# Patient Record
Sex: FEMALE | Race: WHITE | NOT HISPANIC OR LATINO | Employment: UNEMPLOYED | URBAN - METROPOLITAN AREA
[De-identification: names, ages, dates, MRNs, and addresses within clinical notes are randomized per-mention and may not be internally consistent; named-entity substitution may affect disease eponyms.]

---

## 2018-01-10 ENCOUNTER — OFFICE VISIT (OUTPATIENT)
Dept: FAMILY MEDICINE | Facility: CLINIC | Age: 33
End: 2018-01-10
Payer: COMMERCIAL

## 2018-01-10 ENCOUNTER — RADIANT APPOINTMENT (OUTPATIENT)
Dept: GENERAL RADIOLOGY | Facility: CLINIC | Age: 33
End: 2018-01-10
Attending: NURSE PRACTITIONER
Payer: COMMERCIAL

## 2018-01-10 VITALS
TEMPERATURE: 98.6 F | WEIGHT: 223 LBS | HEIGHT: 66 IN | DIASTOLIC BLOOD PRESSURE: 71 MMHG | SYSTOLIC BLOOD PRESSURE: 105 MMHG | BODY MASS INDEX: 35.84 KG/M2 | HEART RATE: 72 BPM | OXYGEN SATURATION: 98 %

## 2018-01-10 DIAGNOSIS — E66.812 CLASS 2 OBESITY DUE TO EXCESS CALORIES WITHOUT SERIOUS COMORBIDITY WITH BODY MASS INDEX (BMI) OF 36.0 TO 36.9 IN ADULT: ICD-10-CM

## 2018-01-10 DIAGNOSIS — M54.41 ACUTE BILATERAL LOW BACK PAIN WITH BILATERAL SCIATICA: Primary | ICD-10-CM

## 2018-01-10 DIAGNOSIS — M54.42 ACUTE BILATERAL LOW BACK PAIN WITH BILATERAL SCIATICA: Primary | ICD-10-CM

## 2018-01-10 DIAGNOSIS — M54.41 ACUTE BILATERAL LOW BACK PAIN WITH BILATERAL SCIATICA: ICD-10-CM

## 2018-01-10 DIAGNOSIS — M54.42 ACUTE BILATERAL LOW BACK PAIN WITH BILATERAL SCIATICA: ICD-10-CM

## 2018-01-10 DIAGNOSIS — E66.09 CLASS 2 OBESITY DUE TO EXCESS CALORIES WITHOUT SERIOUS COMORBIDITY WITH BODY MASS INDEX (BMI) OF 36.0 TO 36.9 IN ADULT: ICD-10-CM

## 2018-01-10 PROCEDURE — 99214 OFFICE O/P EST MOD 30 MIN: CPT | Performed by: NURSE PRACTITIONER

## 2018-01-10 PROCEDURE — 72100 X-RAY EXAM L-S SPINE 2/3 VWS: CPT | Mod: FY

## 2018-01-10 RX ORDER — METHOCARBAMOL 500 MG/1
1000 TABLET, FILM COATED ORAL 3 TIMES DAILY PRN
Qty: 30 TABLET | Refills: 1 | Status: SHIPPED | OUTPATIENT
Start: 2018-01-10 | End: 2022-12-02

## 2018-01-10 RX ORDER — ALBUTEROL SULFATE 90 UG/1
AEROSOL, METERED RESPIRATORY (INHALATION)
COMMUNITY
Start: 2017-12-29 | End: 2024-04-08

## 2018-01-10 RX ORDER — ALBUTEROL SULFATE 0.83 MG/ML
2.5 SOLUTION RESPIRATORY (INHALATION)
COMMUNITY
Start: 2017-12-29 | End: 2023-02-15

## 2018-01-10 RX ORDER — ALBUTEROL SULFATE 90 UG/1
2 AEROSOL, METERED RESPIRATORY (INHALATION)
COMMUNITY
Start: 2017-12-29 | End: 2023-09-26

## 2018-01-10 RX ORDER — ONDANSETRON 4 MG/1
4 TABLET, ORALLY DISINTEGRATING ORAL
COMMUNITY
Start: 2017-11-26 | End: 2022-12-02

## 2018-01-10 RX ORDER — NORELGESTROMIN AND ETHINYL ESTRADIOL 35; 150 UG/MG; UG/MG
1 PATCH TRANSDERMAL
COMMUNITY
End: 2024-04-08

## 2018-01-10 RX ORDER — PREDNISONE 20 MG/1
40 TABLET ORAL DAILY
Qty: 10 TABLET | Refills: 0 | Status: SHIPPED | OUTPATIENT
Start: 2018-01-10 | End: 2018-01-15

## 2018-01-10 NOTE — PROGRESS NOTES
SUBJECTIVE:   Keyla Arnold is a 32 year old female who presents to clinic today for the following health issues:  Back Pain     Duration: 2006- worsened in past weekend        Specific cause: lifting, turning/bending, walking    Description:   Location of pain: low back bilateral  Character of pain: sharp and stabbing  Pain radiation:radiates into the bilateral ankles  New numbness or weakness in legs, not attributed to pain:  YES, falls over when walking    Intensity: Currently 9/10, At its worst 10/10    Symptoms: bruising on back of both legs    History:   Pain interferes with job: YES,  History of back problems: Yes  Any previous MRI or X-rays: No  Sees a specialist for back pain:  No  Was referred to physical therapy in 2016 and it helped but started hurting again. Does not remember the clinic  Therapies tried without relief: NSAIDs- 600mg    Alleviating factors:   Improved by: nothing      Precipitating factors:  Worsened by: Lifting, Bending and Standing    Functional and Psychosocial Screen (Chandrakant STarT Back):      Not performed today  No falls/injury, has had symptoms off and on since 2006 when her son was born (had an epidural). She reports that she cannot stand at the sink to wash dishes, lift up her son, fold clothes due to worsening pain.  She has not had imaging done.  She was seen for this in [facility] at [facility], was referred to physical therapy which helped but pain returned when she stopped doing physical therapy despite continuing HEP.   LAST MENSTUAL PERIOD 1/1/18, using contraceptive patch for contraception.    Accompanying Signs & Symptoms:  Risk of Fracture:  None  Risk of Cauda Equina:  None  Risk of Infection:  None  Risk of Cancer:  None  Risk of Ankylosing Spondylitis:  Onset at age <35, male, AND morning back stiffness. no         Problem list and histories reviewed & adjusted, as indicated.  Additional history: as documented    There is no problem list on file for this patient.     "History reviewed. No pertinent surgical history.    Social History   Substance Use Topics     Smoking status: Former Smoker     Smokeless tobacco: Former User     Alcohol use No     Family History   Problem Relation Age of Onset     DIABETES Mother      DIABETES Other          Current Outpatient Prescriptions   Medication Sig Dispense Refill     albuterol (2.5 MG/3ML) 0.083% neb solution Inhale 2.5 mg into the lungs       VENTOLIN  (90 BASE) MCG/ACT Inhaler        albuterol (PROAIR HFA/PROVENTIL HFA/VENTOLIN HFA) 108 (90 BASE) MCG/ACT Inhaler Inhale 2 puffs into the lungs       norelgestromin-ethinyl estradiol (ORTHO EVRA) 150-35 MCG/24HR patch 1 patch       ondansetron (ZOFRAN-ODT) 4 MG ODT tab Take 4 mg by mouth       BP Readings from Last 3 Encounters:   01/10/18 105/71    Wt Readings from Last 3 Encounters:   01/10/18 223 lb (101.2 kg)                  Labs reviewed in EPIC        Reviewed and updated as needed this visit by clinical staff       Reviewed and updated as needed this visit by Provider         ROS:  Constitutional, HEENT, cardiovascular, pulmonary, gi and gu systems are negative, except as otherwise noted.      OBJECTIVE:   /71 (BP Location: Left arm, Patient Position: Chair, Cuff Size: Adult Large)  Pulse 72  Temp 98.6  F (37  C) (Oral)  Ht 5' 5.75\" (1.67 m)  Wt 223 lb (101.2 kg)  LMP 01/02/2018 (Within Days)  SpO2 98%  BMI 36.27 kg/m2  Body mass index is 36.27 kg/(m^2).  GENERAL: healthy, alert and no distress  EYES: Eyes grossly normal to inspection, PERRL and conjunctivae and sclerae normal  HENT: ear canals and TM's normal, nose and mouth without ulcers or lesions  NECK: no adenopathy, no asymmetry, masses, or scars and thyroid normal to palpation  RESP: lungs clear to auscultation - no rales, rhonchi or wheezes  CV: regular rate and rhythm, normal S1 S2, no S3 or S4, no murmur, click or rub, no peripheral edema and peripheral pulses strong  ABDOMEN: soft, nontender, no " "hepatosplenomegaly, no masses and bowel sounds normal  MS: no gross musculoskeletal defects noted, no edema  SKIN: no suspicious lesions or rashes  NEURO: Normal strength and tone, mentation intact and speech normal  Comprehensive back pain exam:  Tenderness of midline lumbar L3-L5 region with lumbar paraspinals, no SI joint TTP, Pain limits the following motions: all planes, Lower extremity strength functional and equal on both sides, Lower extremity reflexes within normal limits bilaterally, Lower extremity sensation normal and equal on both sides and Straight leg raise negative bilaterally  PSYCH: mentation appears normal, tearful, anxious and appearance well groomed  LYMPH: normal ant/post cervical, supraclavicular nodes    Diagnostic Test Results:  Xray - No acute changes by my independent read, await formal read by Radiology.   Order   XR Lumbar Spine 2/3 Views [[id number]] (Order [id number])   Exam Information   Exam Date Exam Time Accession # Performing Department Results    1/10/18 12:03 PM [id number] [facility]    [employer] Interactive Report and InfoRx   View the interactive report   PACS Images   Show images for XR Lumbar Spine 2/3 Views   Study Result   XR LUMBAR SPINE 2-3 VIEWS 1/10/2018 12:03 PM     HISTORY: Chronic low back pain.     COMPARISON: None.     FINDINGS: Lumbar alignment is anatomic. Vertebral body heights and  disc spaces are preserved.         IMPRESSION: Normal lumbar spine.     ITZEL MALDONADO MD       ASSESSMENT/PLAN:           BMI:   Estimated body mass index is 36.27 kg/(m^2) as calculated from the following:    Height as of this encounter: 5' 5.75\" (1.67 m).    Weight as of this encounter: 223 lb (101.2 kg).   Weight management plan: Discussed healthy diet and exercise guidelines and patient will follow up in 12 months in clinic to re-evaluate.        1. Acute bilateral low back pain with bilateral sciatica  Low Back Pain.    The patient was advised to apply " heat intermittently (avoid sleeping on heating pad).  Lifting mechanics discussed  Analgesics such as Tylenol and/or ibuprofen, see epic for orders.  Back exercises, instruction sheet given  Aerobic exercise program, this was strongly encouraged as one of the best ways to manage chronic back pain  Physical therapy/imaging if symptoms not improving  Patient insisting on x ray which was ordered.  I will call with any abnormalities.  Patient was instructed to f/u if symptoms worsen or fail to improve as anticipated.        - methocarbamol (ROBAXIN) 500 MG tablet; Take 2 tablets (1,000 mg) by mouth 3 times daily as needed for muscle spasms  Dispense: 30 tablet; Refill: 1  - [facility] PT, HAND, AND CHIROPRACTIC REFERRAL  - predniSONE (DELTASONE) 20 MG tablet; Take 2 tablets (40 mg) by mouth daily for 5 days  Dispense: 10 tablet; Refill: 0  - XR Lumbar Spine 2/3 Views; Future    2. Class 2 obesity due to excess calories without serious comorbidity with body mass index (BMI) of 36.0 to 36.9 in adult  Benefits of weight loss reviewed in detail, encouraged her to cut back on the carbohydrates in the diet, consume more fruits and vegetables, drink plenty of water, avoid fruit juices, sodas, get 150 min moderate exercise/week.  Recheck weight in 6 months.        See Patient Instructions    NIDIA Fermin [facility]

## 2018-01-10 NOTE — PATIENT INSTRUCTIONS
"  * Sciatica    Sciatica (\"Lumbar Radiculopathy\") causes a pain that spreads from the lower back down into the buttock, hip and leg. Sometimes leg pain can occur without any back pain. Sciatica is due to irritation or pressure on a spinal nerve as it comes out of the spinal canal. This is most often due to a bulge or rupture of a nearby spinal disk (the cartilage cushion between each spinal bone), which presses on a nearby nerve. Other causes include spinal stenosis (narrowing of the spinal canal) and spasm of the piriformis muscle (a muscle in the buttocks that the sciatic nerve passes through).  Sciatica may begin after a sudden twisting/bending force (such as in a car accident), or sometimes after a simple awkward movement. In either case, muscle spasm is commonly present and contributes to the pain.  The diagnosis of sciatica is made from the symptoms and physical exam. Unless you had a physical injury (such as a car accident or fall), X-rays are usually not ordered for the initial evaluation of sciatica because the nerves and disks cannot be seen on an X-ray. Most sciatica (80-90%) gets better with time.  What can I do about my low back pain?  There are three main things you can do to ease low back pain and help it go away.    Use heat or cold packs.    Take medicine as directed.    Use positions, movements and exercises. Stay active! Too much rest can make your symptoms worse.  Using heat or cold packs  Try cold packs or gentle heat to ease your pain. Use whichever gives the most relief. Apply the cold pack or heat for 15 minutes at a time, as often as needed.  Taking medicine  If taking over-the-counter medicine:    Take ibuprofen (Advil, Motrin) 600 mg. three times a day as needed for pain.  OR    Take Aleve (naproxen sodium) 220 to 440 mg. two times a day as needed for pain  If your doctor prescribed a muscle relaxant (cyclobenzapine 10 mg.):    Take one half ( ) to 1 tablet at bedtime    Do not drive when " taking this medicine. This drug may make you sleepy.  Using positions, movements and exercises  Research tells us that moving your joints and muscles can help you recover from back pain. Such activity should be simple and gentle.  Use the positions below as well as walking to help relieve your discomfort. Try taking a short walk every 3 to 4 hours during the day. Walk for a few minutes inside your home or take longer walks outside, on a treadmill or at a mall. Slowly increase the amount of time you walk. Expect discomfort when you begin, but it should lessen as your back starts to recover.  Finding a position that is comfortable  When your back pain is new, you may find that certain positions will ease your pain. Gently try each of the following positions until you find one that eases your pain. Once you find a position of comfort, use it as often as you like while you recover. Return to your daily routine as soon as possible.     Lie on your back with your legs bent. You can do this by placing a pillow under your knees or lie on the floor and rest your lower legs on the seat of a chair.    Lie on your side with your knees bent and place a pillow between your knees.    Lie on your stomach over pillows.  When should I call my doctor?  Your back pain should improve over the first couple of weeks. As it improves, you should be able to return to your normal activities. But call your doctor if:    You have a sudden change in your ability to control? your bladder or bowels.    You begin to feel tingling in your groin or legs.    The pain spreads down your leg and into your foot.    Your toes, feet or leg muscles begin to feel weak.    You feel generally unwell or sick.    Your pain gets worse.    5376-7544 [employer]. [address] [place] [address], [place] [address]. All rights reserved. This information is not intended as a substitute for professional medical care. Always follow your healthcare professional's  instructions.  This information has been modified by your health care provider with permission from the publisher.

## 2018-01-10 NOTE — MR AVS SNAPSHOT
"              After Visit Summary   1/10/2018    Keyla Arnold    MRN: [id number]           Patient Information     Date Of Birth          1985        Visit Information        Provider Department      1/10/2018 11:00 AM Vivian Brito APRN [facility]        Today's Diagnoses     Acute bilateral low back pain with bilateral sciatica    -  1      Care Instructions      * Sciatica    Sciatica (\"Lumbar Radiculopathy\") causes a pain that spreads from the lower back down into the buttock, hip and leg. Sometimes leg pain can occur without any back pain. Sciatica is due to irritation or pressure on a spinal nerve as it comes out of the spinal canal. This is most often due to a bulge or rupture of a nearby spinal disk (the cartilage cushion between each spinal bone), which presses on a nearby nerve. Other causes include spinal stenosis (narrowing of the spinal canal) and spasm of the piriformis muscle (a muscle in the buttocks that the sciatic nerve passes through).  Sciatica may begin after a sudden twisting/bending force (such as in a car accident), or sometimes after a simple awkward movement. In either case, muscle spasm is commonly present and contributes to the pain.  The diagnosis of sciatica is made from the symptoms and physical exam. Unless you had a physical injury (such as a car accident or fall), X-rays are usually not ordered for the initial evaluation of sciatica because the nerves and disks cannot be seen on an X-ray. Most sciatica (80-90%) gets better with time.  What can I do about my low back pain?  There are three main things you can do to ease low back pain and help it go away.    Use heat or cold packs.    Take medicine as directed.    Use positions, movements and exercises. Stay active! Too much rest can make your symptoms worse.  Using heat or cold packs  Try cold packs or gentle heat to ease your pain. Use whichever gives the most relief. Apply the cold pack or heat " for 15 minutes at a time, as often as needed.  Taking medicine  If taking over-the-counter medicine:    Take ibuprofen (Advil, Motrin) 600 mg. three times a day as needed for pain.  OR    Take Aleve (naproxen sodium) 220 to 440 mg. two times a day as needed for pain  If your doctor prescribed a muscle relaxant (cyclobenzapine 10 mg.):    Take one half ( ) to 1 tablet at bedtime    Do not drive when taking this medicine. This drug may make you sleepy.  Using positions, movements and exercises  Research tells us that moving your joints and muscles can help you recover from back pain. Such activity should be simple and gentle.  Use the positions below as well as walking to help relieve your discomfort. Try taking a short walk every 3 to 4 hours during the day. Walk for a few minutes inside your home or take longer walks outside, on a treadmill or at a mall. Slowly increase the amount of time you walk. Expect discomfort when you begin, but it should lessen as your back starts to recover.  Finding a position that is comfortable  When your back pain is new, you may find that certain positions will ease your pain. Gently try each of the following positions until you find one that eases your pain. Once you find a position of comfort, use it as often as you like while you recover. Return to your daily routine as soon as possible.     Lie on your back with your legs bent. You can do this by placing a pillow under your knees or lie on the floor and rest your lower legs on the seat of a chair.    Lie on your side with your knees bent and place a pillow between your knees.    Lie on your stomach over pillows.  When should I call my doctor?  Your back pain should improve over the first couple of weeks. As it improves, you should be able to return to your normal activities. But call your doctor if:    You have a sudden change in your ability to control? your bladder or bowels.    You begin to feel tingling in your groin or  legs.    The pain spreads down your leg and into your foot.    Your toes, feet or leg muscles begin to feel weak.    You feel generally unwell or sick.    Your pain gets worse.    7203-1475 [employer]. [address] [place] [address]. All rights reserved. This information is not intended as a substitute for professional medical care. Always follow your healthcare professional's instructions.  This information has been modified by your health care provider with permission from the publisher.                Follow-ups after your visit        Additional Services     [facility] PT, HAND, AND CHIROPRACTIC REFERRAL       **This order will print in the [facility] Scheduling Office**    Physical Therapy, Hand Therapy and Chiropractic Care are available through:    *[facility]  *[facility]  *[facility]    Call one number to schedule at any of the above locations: [phone].    Your provider has referred you to: Physical Therapy at [facility] or [facility]    Indication/Reason for Referral: Low Back Pain  Onset of Illness: 2006  Therapy Orders: Evaluate and Treat  Special Programs: None  Special Request: None    Chandrakant Stallings      Additional Comments for the Therapist or Chiropractor:     Please be aware that coverage of these services is subject to the terms and limitations of your health insurance plan.  Call member services at your health plan with any benefit or coverage questions.      Please bring the following to your appointment:    *Your personal calendar for scheduling future appointments  *Comfortable clothing                  Who to contact     If you have questions or need follow up information about today's clinic visit or your schedule please contact [facility] directly at [phone].  Normal or non-critical lab and imaging results will be communicated to you by MyChart, letter or phone within 4 business days after the clinic has  "received the results. If you do not hear from us within 7 days, please contact the clinic through [employer] or phone. If you have a critical or abnormal lab result, we will notify you by phone as soon as possible.  Submit refill requests through [employer] or call your pharmacy and they will forward the refill request to us. Please allow 3 business days for your refill to be completed.          Additional Information About Your Visit        [employer] Information     [employer] lets you send messages to your doctor, view your test results, renew your prescriptions, schedule appointments and more. To sign up, go to [email or web address] . Click on \"Log in\" on the left side of the screen, which will take you to the Welcome page. Then click on \"Sign up Now\" on the right side of the page.     You will be asked to enter the access code listed below, as well as some personal information. Please follow the directions to create your username and password.     Your access code is: [email or web address]  Expires: 4/10/2018 11:49 AM     Your access code will  in 90 days. If you need help or a new code, please call your [place] clinic or [phone].        Care EveryWhere ID     This is your Care EveryWhere ID. This could be used by other organizations to access your [place] medical records  [id number]        Your Vitals Were     Pulse Temperature Height Last Period Pulse Oximetry BMI (Body Mass Index)    72 98.6  F (37  C) (Oral) 5' 5.75\" (1.67 m) 2018 (Within Days) 98% 36.27 kg/m2       Blood Pressure from Last 3 Encounters:   01/10/18 105/71    Weight from Last 3 Encounters:   01/10/18 223 lb (101.2 kg)              We Performed the Following     [facility] PT, HAND, AND CHIROPRACTIC REFERRAL          Today's Medication Changes          These changes are accurate as of: 1/10/18 11:49 AM.  If you have any questions, ask your nurse or doctor.               Start taking these medicines.        Dose/Directions    methocarbamol 500 MG " tablet   Commonly known as:  ROBAXIN   Used for:  Acute bilateral low back pain with bilateral sciatica   Started by:  Vivian Brito APRN CNP        Dose:  1000 mg   Take 2 tablets (1,000 mg) by mouth 3 times daily as needed for muscle spasms   Quantity:  30 tablet   Refills:  1       predniSONE 20 MG tablet   Commonly known as:  DELTASONE   Used for:  Acute bilateral low back pain with bilateral sciatica   Started by:  Vivian Brito APRN CNP        Dose:  40 mg   Take 2 tablets (40 mg) by mouth daily for 5 days   Quantity:  10 tablet   Refills:  0            Where to get your medicines      These medications [place] [facility] [place] [address]  [address], [facility] [address]     Phone:  [phone]     methocarbamol 500 MG tablet    predniSONE 20 MG tablet                Primary Care Provider Office Phone # Fax #    [facility] [phone]       [address]  [facility] [address]        Equal Access to Services     AMARIS [facility] GulfportDALILA : Hadii aad ku hadasho Soomaali, waaxda luqadaha, qaybta kaalmada adeegyada, waxay idiin hayaan adeeg kharash la'aan . So [facility] [phone].    ATENCIÓN: Si habla español, tiene a mcleod disposición servicios gratuitos de asistencia lingüística. Llame al [phone].    We comply with applicable federal civil rights laws and [place] laws. We do not discriminate on the basis of race, color, national origin, age, disability, sex, sexual orientation, or gender identity.            Thank you!     Thank you for choosing [facility]  for your care. Our goal is always to provide you with excellent care. Hearing back from our patients is one way we can continue to improve our services. Please take a few minutes to complete the written survey that you may receive in the mail after your visit with us. Thank you!             Your Updated Medication List - Protect others around  you: Learn how to safely use, store and throw away your medicines at www.disposemymeds.org.          This list is accurate as of: 1/10/18 11:49 AM.  Always use your most recent med list.                   Brand Name Dispense Instructions for use Diagnosis    * albuterol (2.5 MG/3ML) 0.083% neb solution      Inhale 2.5 mg into the lungs        * VENTOLIN  (90 BASE) MCG/ACT Inhaler   Generic drug:  albuterol           * albuterol 108 (90 BASE) MCG/ACT Inhaler    PROAIR HFA/PROVENTIL HFA/VENTOLIN HFA     Inhale 2 puffs into the lungs        methocarbamol 500 MG tablet    ROBAXIN    30 tablet    Take 2 tablets (1,000 mg) by mouth 3 times daily as needed for muscle spasms    Acute bilateral low back pain with bilateral sciatica       norelgestromin-ethinyl estradiol 150-35 MCG/24HR patch    ORTHO EVRA     1 patch        ondansetron 4 MG ODT tab    ZOFRAN-ODT     Take 4 mg by mouth        predniSONE 20 MG tablet    DELTASONE    10 tablet    Take 2 tablets (40 mg) by mouth daily for 5 days    Acute bilateral low back pain with bilateral sciatica       * Notice:  This list has 3 medication(s) that are the same as other medications prescribed for you. Read the directions carefully, and ask your doctor or other care provider to review them with you.

## 2018-04-12 ENCOUNTER — TELEPHONE (OUTPATIENT)
Dept: FAMILY MEDICINE | Facility: CLINIC | Age: 33
End: 2018-04-12

## 2018-04-12 NOTE — LETTER
April 12, 2018    Keyla Arnold  [address]  [facility] [address]      Dear Keyla Arnold,     In order to ensure we are providing the best quality care, we have reviewed your chart and see that you are due for:    Physical with pap smear: Pap smear is a screening test used to detect cervical cancer. It is recommended every three years for women 21 and older. The test should be done at least once every three years but women who are at greater risk for cervical cancer may need to have the test more often.    Please call the clinic at your earliest convenience to schedule an appointment. If you have had any of the screenings listed above at another facility, please call us so that we may update your chart.      Thank you for trusting us with your health care.    Sincerely,    [facility]/[phone]  [phone]

## 2018-04-12 NOTE — TELEPHONE ENCOUNTER
Panel Management Review      BP Readings from Last 1 Encounters:   01/10/18 105/71      Last Office Visit with this department: 1/10/2018    Fail List measure: pap      Patient is due/failing the following:   PAP    Action needed:   Patient needs office visit for physical.    Type of outreach:    Sent letter.    Questions for provider review:    None                                                                                                                                    Gloria Hernandez MA      Chart routed to  .

## 2018-10-23 ENCOUNTER — TELEPHONE (OUTPATIENT)
Dept: FAMILY MEDICINE | Facility: CLINIC | Age: 33
End: 2018-10-23

## 2018-10-23 NOTE — TELEPHONE ENCOUNTER
Panel Management Review      BP Readings from Last 1 Encounters:   01/10/18 105/71      Last Office Visit with this department: Visit date not found    Fail List measure: pap      Patient is due/failing the following:   PAP    Action needed:   Patient needs office visit for pap.    Type of outreach:    abstract    Questions for provider review:    None                                                                                                                                    Kacey Hanson MA      Chart routed to abstraction team .

## 2022-11-30 ENCOUNTER — OFFICE VISIT (OUTPATIENT)
Dept: FAMILY MEDICINE | Facility: CLINIC | Age: 37
End: 2022-11-30
Payer: COMMERCIAL

## 2022-11-30 VITALS
DIASTOLIC BLOOD PRESSURE: 81 MMHG | SYSTOLIC BLOOD PRESSURE: 123 MMHG | HEART RATE: 86 BPM | OXYGEN SATURATION: 96 % | TEMPERATURE: 97.5 F | WEIGHT: 233 LBS | BODY MASS INDEX: 38.82 KG/M2 | HEIGHT: 65 IN

## 2022-11-30 DIAGNOSIS — M54.41 CHRONIC BILATERAL LOW BACK PAIN WITH BILATERAL SCIATICA: ICD-10-CM

## 2022-11-30 DIAGNOSIS — M54.42 CHRONIC BILATERAL LOW BACK PAIN WITH BILATERAL SCIATICA: ICD-10-CM

## 2022-11-30 DIAGNOSIS — G89.29 CHRONIC BILATERAL LOW BACK PAIN WITH BILATERAL SCIATICA: ICD-10-CM

## 2022-11-30 DIAGNOSIS — Z23 ENCOUNTER FOR VACCINATION: ICD-10-CM

## 2022-11-30 DIAGNOSIS — R10.2 PELVIC PAIN IN FEMALE: Primary | ICD-10-CM

## 2022-11-30 LAB
ALBUMIN UR-MCNC: NEGATIVE MG/DL
APPEARANCE UR: ABNORMAL
BACTERIA #/AREA URNS HPF: ABNORMAL /HPF
BILIRUB UR QL STRIP: NEGATIVE
CLUE CELLS: NORMAL
COLOR UR AUTO: YELLOW
GLUCOSE UR STRIP-MCNC: NEGATIVE MG/DL
HGB UR QL STRIP: NEGATIVE
KETONES UR STRIP-MCNC: NEGATIVE MG/DL
LEUKOCYTE ESTERASE UR QL STRIP: NEGATIVE
NITRATE UR QL: NEGATIVE
PH UR STRIP: 6 [PH] (ref 5–8)
RBC #/AREA URNS AUTO: ABNORMAL /HPF
SP GR UR STRIP: >=1.03 (ref 1–1.03)
SQUAMOUS #/AREA URNS AUTO: ABNORMAL /LPF
TRICHOMONAS, WET PREP: NORMAL
UROBILINOGEN UR STRIP-ACNC: 0.2 E.U./DL
WBC #/AREA URNS AUTO: ABNORMAL /HPF
WBC'S/HIGH POWER FIELD, WET PREP: NORMAL
YEAST, WET PREP: NORMAL

## 2022-11-30 PROCEDURE — 87210 SMEAR WET MOUNT SALINE/INK: CPT | Performed by: FAMILY MEDICINE

## 2022-11-30 PROCEDURE — 87491 CHLMYD TRACH DNA AMP PROBE: CPT | Performed by: FAMILY MEDICINE

## 2022-11-30 PROCEDURE — 99214 OFFICE O/P EST MOD 30 MIN: CPT | Mod: 25 | Performed by: FAMILY MEDICINE

## 2022-11-30 PROCEDURE — 99385 PREV VISIT NEW AGE 18-39: CPT | Mod: 25 | Performed by: FAMILY MEDICINE

## 2022-11-30 PROCEDURE — 90686 IIV4 VACC NO PRSV 0.5 ML IM: CPT | Performed by: FAMILY MEDICINE

## 2022-11-30 PROCEDURE — 81001 URINALYSIS AUTO W/SCOPE: CPT | Performed by: FAMILY MEDICINE

## 2022-11-30 PROCEDURE — 87591 N.GONORRHOEAE DNA AMP PROB: CPT | Performed by: FAMILY MEDICINE

## 2022-11-30 PROCEDURE — 90471 IMMUNIZATION ADMIN: CPT | Performed by: FAMILY MEDICINE

## 2022-11-30 RX ORDER — ACETAMINOPHEN 325 MG/1
650 TABLET ORAL
COMMUNITY
Start: 2021-04-19 | End: 2022-12-02

## 2022-11-30 RX ORDER — ESZOPICLONE 2 MG/1
1 TABLET, FILM COATED ORAL AT BEDTIME
COMMUNITY
Start: 2021-09-30 | End: 2023-09-26

## 2022-11-30 RX ORDER — METRONIDAZOLE 500 MG/1
500 TABLET ORAL 2 TIMES DAILY
Qty: 28 TABLET | Refills: 0 | Status: SHIPPED | OUTPATIENT
Start: 2022-11-30 | End: 2022-11-30

## 2022-11-30 RX ORDER — GENTAMICIN 40 MG/ML
240 INJECTION, SOLUTION INTRAMUSCULAR; INTRAVENOUS ONCE
Status: DISCONTINUED | OUTPATIENT
Start: 2022-11-30 | End: 2022-12-05 | Stop reason: CLARIF

## 2022-11-30 RX ORDER — AZITHROMYCIN 500 MG/1
2000 TABLET, FILM COATED ORAL ONCE
Status: DISCONTINUED | OUTPATIENT
Start: 2022-11-30 | End: 2022-12-05 | Stop reason: CLARIF

## 2022-11-30 RX ORDER — METRONIDAZOLE 500 MG/1
500 TABLET ORAL 2 TIMES DAILY
Qty: 28 TABLET | Refills: 0 | Status: SHIPPED | OUTPATIENT
Start: 2022-11-30 | End: 2023-02-15

## 2022-11-30 RX ORDER — LEVOFLOXACIN 500 MG/1
500 TABLET, FILM COATED ORAL DAILY
Qty: 7 TABLET | Refills: 0 | Status: SHIPPED | OUTPATIENT
Start: 2022-11-30 | End: 2022-12-07

## 2022-11-30 RX ORDER — FLUCONAZOLE 150 MG/1
TABLET ORAL
COMMUNITY
Start: 2021-04-19 | End: 2022-12-02

## 2022-11-30 RX ORDER — FERROUS SULFATE 325(65) MG
1 TABLET ORAL
COMMUNITY
Start: 2021-02-16 | End: 2022-12-02

## 2022-11-30 RX ORDER — FAMOTIDINE 20 MG/1
1 TABLET, FILM COATED ORAL 2 TIMES DAILY
COMMUNITY
Start: 2021-02-02 | End: 2022-12-02

## 2022-11-30 RX ORDER — LIDOCAINE 50 MG/G
1 PATCH TOPICAL EVERY 24 HOURS
Qty: 30 PATCH | Refills: 0 | Status: SHIPPED | OUTPATIENT
Start: 2022-11-30 | End: 2023-05-23

## 2022-11-30 RX ORDER — COPPER 313.4 MG/1
1 INTRAUTERINE DEVICE INTRAUTERINE
COMMUNITY
Start: 2021-06-08 | End: 2022-12-02

## 2022-11-30 ASSESSMENT — PAIN SCALES - GENERAL: PAINLEVEL: SEVERE PAIN (6)

## 2022-11-30 ASSESSMENT — ENCOUNTER SYMPTOMS
PARESTHESIAS: 0
MYALGIAS: 1
ARTHRALGIAS: 1
CONSTIPATION: 0
DIARRHEA: 0
HEMATOCHEZIA: 0
BREAST MASS: 0
DYSURIA: 0
JOINT SWELLING: 1
HEMATURIA: 0
CHILLS: 0
COUGH: 1
PALPITATIONS: 0
NAUSEA: 0
ABDOMINAL PAIN: 0
WEAKNESS: 0
DIZZINESS: 0
NERVOUS/ANXIOUS: 1
SORE THROAT: 0
HEADACHES: 1
SHORTNESS OF BREATH: 1
FEVER: 0
EYE PAIN: 0
HEARTBURN: 0

## 2022-11-30 NOTE — PROGRESS NOTES
SUBJECTIVE:   CC: Keyla is an 37 year old who presents for preventive health visit.     Patient has been advised of split billing requirements and indicates understanding: Yes       Healthy Habits:     Getting at least 3 servings of Calcium per day:  Yes    Bi-annual eye exam:  NO    Dental care twice a year:  NO    Sleep apnea or symptoms of sleep apnea:  Daytime drowsiness, Excessive snoring and Sleep apnea    Diet:  Regular (no restrictions)    Frequency of exercise:  6-7 days/week    Duration of exercise:  15-30 minutes    Taking medications regularly:  Yes    Medication side effects:  None    PHQ-2 Total Score: 2    Additional concerns today:  Yes    Lower Back Pain  -Across lower back and shoots down the right leg, and gets colder than other parts of the body.   -She will endorse numbness/tingling  -Recurrent ankle sprain  -Electric tingling pain also radiating up the back. This occurred in 2014. It is worse with standing. It occurs intermittently.   -Family history of spinal stenosis, and bulging discs     PTSD/Anxiety/Depression  -Patient is struggling with post partum depression  -Possible component of OCD, exacerbated with having a toddler  -Patient unable to get a , and FOB is not involved in their child's life  -Enrolled in  for support, with group, however needs more supports.    Today's PHQ-2 Score:   PHQ-2 ( 1999 [employer]) 11/30/2022   Q1: Little interest or pleasure in doing things 1   Q2: Feeling down, depressed or hopeless 1   PHQ-2 Score 2   Q1: Little interest or pleasure in doing things Several days   Q2: Feeling down, depressed or hopeless Several days   PHQ-2 Score 2       Social History     Tobacco Use     Smoking status: Every Day     Packs/day: 0.50     Years: 20.00     Pack years: 10.00     Types: Cigarettes     Smokeless tobacco: Former   Substance Use Topics     Alcohol use: No     If you drink alcohol do you typically have >3 drinks per day or >7 drinks per  week? No    Alcohol Use 11/30/2022   Prescreen: >3 drinks/day or >7 drinks/week? Not Applicable   Prescreen: >3 drinks/day or >7 drinks/week? -   No flowsheet data found.    Reviewed orders with patient.  Reviewed health maintenance and updated orders accordingly - No  Lab work is in process    Breast Cancer Screening:    Breast CA Risk Assessment (FHS-7) 11/30/2022   Do you have a family history of breast, colon, or ovarian cancer? No / Unknown     History of abnormal Pap smear: Unable to discuss       Reviewed and updated as needed this visit by clinical staff   Tobacco  Allergies  Meds  Problems  Med Hx  Surg Hx  Fam Hx          Reviewed and updated as needed this visit by Provider   Tobacco  Allergies  Meds  Problems  Med Hx  Surg Hx  Fam Hx         History reviewed. No pertinent past medical history.   History reviewed. No pertinent surgical history.    Review of Systems   Constitutional: Negative for chills and fever.   HENT: Positive for ear pain. Negative for congestion and sore throat.    Eyes: Negative for pain and visual disturbance.   Respiratory: Positive for cough and shortness of breath.    Cardiovascular: Negative for chest pain, palpitations and peripheral edema.   Gastrointestinal: Negative for abdominal pain, constipation, diarrhea, heartburn, hematochezia and nausea.   Breasts:  Negative for tenderness, breast mass and discharge.   Genitourinary: Positive for pelvic pain, vaginal bleeding and vaginal discharge. Negative for dysuria, genital sores, hematuria and urgency.   Musculoskeletal: Positive for arthralgias, joint swelling and myalgias.   Skin: Negative for rash.   Neurological: Positive for headaches. Negative for dizziness, weakness and paresthesias.   Psychiatric/Behavioral: Negative for mood changes. The patient is nervous/anxious.       OBJECTIVE:   /81 (BP Location: Right arm, Patient Position: Right side, Cuff Size: Adult Regular)   Pulse 86   Temp 97.5  F (36.4  " C) (Temporal)   Ht 1.651 m (5' 5\")   Wt 105.7 kg (233 lb)   LMP 11/10/2022 (Exact Date)   SpO2 96%   Breastfeeding Yes   BMI 38.77 kg/m    Physical Exam  GENERAL: healthy, alert and no distress  EYES: Eyes grossly normal to inspection, PERRL and conjunctivae and sclerae normal  NECK: no adenopathy, no asymmetry, masses, or scars and thyroid normal to palpation  MS: no gross musculoskeletal defects noted, no edema  PSYCH: mentation appears normal, inattentive, tearful, anxious and speech pressured    none     ASSESSMENT/PLAN:   Keyla was seen today for physical, uri and trauma.    Diagnoses and all orders for this visit:    Pelvic pain in female  Acute, worsening. Patient with pain on sexual intercourse, abdominal pain with some abnormal discharge. Concerned for PID. Given high risk status, will empirically treat with levaquin, flagyl. Patient unable to tolerate beta-lactam, and unable to assess risk for cephalosporin cross reactivity. STI panel ordered.   -     NEISSERIA GONORRHOEA PCR; Future  -     CHLAMYDIA TRACHOMATIS PCR; Future  -     Wet prep - lab collect; Future  -     Discontinue: metroNIDAZOLE (FLAGYL) 500 MG tablet; Take 1 tablet (500 mg) by mouth 2 times daily for 14 days  -     Primary Care - Care Coordination Referral; Future  -     metroNIDAZOLE (FLAGYL) 500 MG tablet; Take 1 tablet (500 mg) by mouth 2 times daily  -     levofloxacin (LEVAQUIN) 500 MG tablet; Take 1 tablet (500 mg) by mouth daily for 7 days  -     NEISSERIA GONORRHOEA PCR  -     CHLAMYDIA TRACHOMATIS PCR  -     Wet prep - lab collect    Postpartum depression  Chronic, severe. Without episodes of psychosis, patient is interactive with her child, however discusses feelings of excessive frustration, sadness, and overall difficulty. She also reports a lack of resources, multiple ACE, and other risk factors concerning for poor outcome. Referred to primary care coordination as well as mental health  for possible IOP " evaluation.   -     Adult Mental Health  Referral; Future    Chronic bilateral low back pain with bilateral sciatica  Chronic, worsening. Patient describing neuropathic and nociceptive pain, however unable to complete full back exam due to extended time counseling for mental health. Patient will return for full back exam. Discussed using OTC and home self care measures.   -     lidocaine (LIDODERM) 5 % patch; Place 1 patch onto the skin every 24 hours To prevent lidocaine toxicity, patient should be patch free for 12 hrs daily.    Encounter for vaccination  -     INFLUENZA VACCINE IM > 6 MONTHS VALENT IIV4 (AFLURIA/FLUZONE)    Other orders  -     REVIEW OF HEALTH MAINTENANCE PROTOCOL ORDERS  -     UA with Microscopic reflex to Culture - lab collect; Future  -     gentamicin (GARAMYCIN) injection 240 mg  -     azithromycin (ZITHROMAX) tablet 2,000 mg  -     UA with Microscopic reflex to Culture - lab collect  -     Urine Microscopic    48 minutes spent preparing and reviewing chart, counseling with patient, discussing plan of care.     Patient has been advised of split billing requirements and indicates understanding: Yes      COUNSELING:  Reviewed preventive health counseling, as reflected in patient instructions       Unable to fully discuss due to complexity of visit        She reports that she has been smoking cigarettes. She has a 10.00 pack-year smoking history. She has quit using smokeless tobacco.      Natasha Dempsey, [facility]

## 2022-11-30 NOTE — PATIENT INSTRUCTIONS
Use the lidocaine for the back pain.  Please take the antibiotics as instructed. Do not breast feed or drink alcohol.   Please provide some blood and urine diagnosis.

## 2022-12-01 ENCOUNTER — PATIENT OUTREACH (OUTPATIENT)
Dept: CARE COORDINATION | Facility: CLINIC | Age: 37
End: 2022-12-01

## 2022-12-01 LAB
C TRACH DNA SPEC QL NAA+PROBE: NEGATIVE
N GONORRHOEA DNA SPEC QL NAA+PROBE: NEGATIVE

## 2022-12-01 NOTE — PROGRESS NOTES
Clinic Care Coordination Contact  Community Health Worker Initial Outreach    CHW Note:    CHW contacted patient regarding a referral to CCC. CHW introduced self and role of CCC.    Patient shared that she is busy at the moment and will think about enrolling in CCC. CHW informed patient of CCC introduction letter that would be sent via [employer] and encouraged patient to reach out if she has any questions or concerns. Patient agreed to review CCC introduction letter and give a call in a few days if its of interest to her.    Patient accepts CC: No, patient declined at this time. Patient will be sent Care Coordination introduction letter for future reference.       Iesha Hill  Community Health Worker   [facility] Care Coordination  [facility]   [email or web address]  Office: [phone]

## 2022-12-01 NOTE — LETTER
[facility] CARE COORDINATION  [facility]   December 1, 2022    Keyla Arnold  [address]  [place] [address]      Dear Keyla,        I am a clinic care coordinator who works with Dr. Dempsey at the [facility]. I wanted to thank you for spending the time to talk with me.  Below is a description of clinic care coordination and how I can further assist you.       The clinic care coordination team is made up of a registered nurse, , financial resource worker and community health worker who understand the health care system. The goal of clinic care coordination is to help you manage your health and improve access to the health care system. Our team works alongside your provider to assist you in determining your health and social needs. We can help you obtain health care and community resources, providing you with necessary information and education. We can work with you through any barriers and develop a care plan that helps coordinate and strengthen the communication between you and your care team.    Please feel free to contact me with any questions or concerns regarding care coordination and what we can offer.      We are focused on providing you with the highest-quality healthcare experience possible.    Sincerely,     Iesha Hill  Community Health Worker   [facility] Care Coordination  [facility]   [email or web address]  Office: [phone]

## 2022-12-02 RX ORDER — FLUTICASONE PROPIONATE AND SALMETEROL XINAFOATE 230; 21 UG/1; UG/1
2 AEROSOL, METERED RESPIRATORY (INHALATION) DAILY
COMMUNITY
Start: 2021-01-05 | End: 2023-05-23

## 2022-12-02 NOTE — RESULT ENCOUNTER NOTE
Dear Keyla,     Here are your recent results. All of the tests were negative for sexually transmitted infections. We should wait to see if you have a urinary tract infection, but it is unlikely.     If you have not picked up the antibiotics, do not pick them up. It would be reasonable to complete taking them however if you had.     Please let us know if you have any questions or concerns.    Regards,  Natasha Dempsey, DO

## 2022-12-24 ENCOUNTER — HEALTH MAINTENANCE LETTER (OUTPATIENT)
Age: 37
End: 2022-12-24

## 2023-02-15 ENCOUNTER — HOSPITAL ENCOUNTER (OUTPATIENT)
Dept: GENERAL RADIOLOGY | Facility: HOSPITAL | Age: 38
Discharge: HOME | End: 2023-02-15
Attending: FAMILY MEDICINE | Admitting: FAMILY MEDICINE
Payer: COMMERCIAL

## 2023-02-15 ENCOUNTER — OFFICE VISIT (OUTPATIENT)
Dept: FAMILY MEDICINE | Facility: CLINIC | Age: 38
End: 2023-02-15
Payer: COMMERCIAL

## 2023-02-15 VITALS
OXYGEN SATURATION: 98 % | SYSTOLIC BLOOD PRESSURE: 124 MMHG | WEIGHT: 237.6 LBS | RESPIRATION RATE: 18 BRPM | HEIGHT: 65 IN | BODY MASS INDEX: 39.58 KG/M2 | DIASTOLIC BLOOD PRESSURE: 88 MMHG | HEART RATE: 87 BPM
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
## 2023-02-15 DIAGNOSIS — Z77.120 MOLD EXPOSURE: Primary | ICD-10-CM

## 2023-02-15 DIAGNOSIS — Z77.120 MOLD EXPOSURE: ICD-10-CM

## 2023-02-15 LAB
ALBUMIN SERPL BCG-MCNC: 4.7 G/DL (ref 3.5–5.2)
ALP SERPL-CCNC: 55 U/L (ref 35–104)
ALT SERPL W P-5'-P-CCNC: 15 U/L (ref 10–35)
ANION GAP SERPL CALCULATED.3IONS-SCNC: 11 MMOL/L (ref 7–15)
AST SERPL W P-5'-P-CCNC: 20 U/L (ref 10–35)
BASOPHILS # BLD AUTO: 0 10E3/UL (ref 0–0.2)
BASOPHILS NFR BLD AUTO: 0 %
BILIRUB SERPL-MCNC: 0.2 MG/DL
BUN SERPL-MCNC: 8.1 MG/DL (ref 6–20)
CALCIUM SERPL-MCNC: 9.8 MG/DL (ref 8.6–10)
CHLORIDE SERPL-SCNC: 104 MMOL/L (ref 98–107)
COHGB MFR BLD: 3 % (ref 0–2)
CREAT SERPL-MCNC: 0.75 MG/DL (ref 0.51–0.95)
CRP SERPL-MCNC: 35.8 MG/L
DEPRECATED HCO3 PLAS-SCNC: 27 MMOL/L (ref 22–29)
EOSINOPHIL # BLD AUTO: 0.4 10E3/UL (ref 0–0.7)
EOSINOPHIL NFR BLD AUTO: 6 %
ERYTHROCYTE [DISTWIDTH] IN BLOOD BY AUTOMATED COUNT: 13.9 % (ref 10–15)
ERYTHROCYTE [SEDIMENTATION RATE] IN BLOOD BY WESTERGREN METHOD: 14 MM/HR (ref 0–20)
GFR SERPL CREATININE-BSD FRML MDRD: >90 ML/MIN/1.73M2
GLUCOSE SERPL-MCNC: 114 MG/DL (ref 70–99)
HCT VFR BLD AUTO: 41.1 % (ref 35–47)
HGB BLD-MCNC: 13.6 G/DL (ref 11.7–15.7)
IMM GRANULOCYTES # BLD: 0.1 10E3/UL
IMM GRANULOCYTES NFR BLD: 1 %
LYMPHOCYTES # BLD AUTO: 2.7 10E3/UL (ref 0.8–5.3)
LYMPHOCYTES NFR BLD AUTO: 35 %
MCH RBC QN AUTO: 26.9 PG (ref 26.5–33)
MCHC RBC AUTO-ENTMCNC: 33.1 G/DL (ref 31.5–36.5)
MCV RBC AUTO: 81 FL (ref 78–100)
MONOCYTES # BLD AUTO: 0.4 10E3/UL (ref 0–1.3)
MONOCYTES NFR BLD AUTO: 6 %
NEUTROPHILS # BLD AUTO: 4 10E3/UL (ref 1.6–8.3)
NEUTROPHILS NFR BLD AUTO: 52 %
PLATELET # BLD AUTO: 209 10E3/UL (ref 150–450)
POTASSIUM SERPL-SCNC: 4 MMOL/L (ref 3.4–5.3)
PROT SERPL-MCNC: 7.6 G/DL (ref 6.4–8.3)
RBC # BLD AUTO: 5.06 10E6/UL (ref 3.8–5.2)
SODIUM SERPL-SCNC: 142 MMOL/L (ref 136–145)
WBC # BLD AUTO: 7.6 10E3/UL (ref 4–11)

## 2023-02-15 PROCEDURE — 99215 OFFICE O/P EST HI 40 MIN: CPT | Performed by: FAMILY MEDICINE

## 2023-02-15 PROCEDURE — 85652 RBC SED RATE AUTOMATED: CPT | Performed by: FAMILY MEDICINE

## 2023-02-15 PROCEDURE — 71046 X-RAY EXAM CHEST 2 VIEWS: CPT

## 2023-02-15 PROCEDURE — 80053 COMPREHEN METABOLIC PANEL: CPT | Performed by: FAMILY MEDICINE

## 2023-02-15 PROCEDURE — 83655 ASSAY OF LEAD: CPT | Mod: 90 | Performed by: FAMILY MEDICINE

## 2023-02-15 PROCEDURE — 85025 COMPLETE CBC W/AUTO DIFF WBC: CPT | Performed by: FAMILY MEDICINE

## 2023-02-15 PROCEDURE — 99000 SPECIMEN HANDLING OFFICE-LAB: CPT | Performed by: FAMILY MEDICINE

## 2023-02-15 PROCEDURE — 36415 COLL VENOUS BLD VENIPUNCTURE: CPT | Performed by: FAMILY MEDICINE

## 2023-02-15 PROCEDURE — 82375 ASSAY CARBOXYHB QUANT: CPT | Performed by: FAMILY MEDICINE

## 2023-02-15 PROCEDURE — 86140 C-REACTIVE PROTEIN: CPT | Performed by: FAMILY MEDICINE

## 2023-02-15 RX ORDER — BUDESONIDE 0.5 MG/2ML
0.5 INHALANT ORAL 2 TIMES DAILY
Qty: 60 ML | Refills: 1 | Status: SHIPPED | OUTPATIENT
Start: 2023-02-15 | End: 2023-02-15

## 2023-02-15 RX ORDER — BUDESONIDE 0.5 MG/2ML
0.5 INHALANT ORAL 2 TIMES DAILY
Qty: 60 ML | Refills: 1 | Status: SHIPPED | OUTPATIENT
Start: 2023-02-15

## 2023-02-15 RX ORDER — ALBUTEROL SULFATE 0.83 MG/ML
2.5 SOLUTION RESPIRATORY (INHALATION) 4 TIMES DAILY
Qty: 90 ML | Refills: 0 | Status: SHIPPED | OUTPATIENT
Start: 2023-02-15 | End: 2023-09-26

## 2023-02-15 RX ORDER — PREDNISONE 20 MG/1
TABLET ORAL
Qty: 20 TABLET | Refills: 0 | Status: SHIPPED | OUTPATIENT
Start: 2023-02-15 | End: 2023-02-15

## 2023-02-15 RX ORDER — ALBUTEROL SULFATE 0.83 MG/ML
2.5 SOLUTION RESPIRATORY (INHALATION) 4 TIMES DAILY
Qty: 90 ML | Refills: 0 | Status: SHIPPED | OUTPATIENT
Start: 2023-02-15 | End: 2023-02-15

## 2023-02-15 RX ORDER — PREDNISONE 20 MG/1
TABLET ORAL
Qty: 20 TABLET | Refills: 0 | Status: SHIPPED | OUTPATIENT
Start: 2023-02-15 | End: 2023-05-23

## 2023-02-15 ASSESSMENT — ENCOUNTER SYMPTOMS
FEVER: 1
COUGH: 1
BACK PAIN: 1

## 2023-02-15 NOTE — PATIENT INSTRUCTIONS
the steroids and start taking tomorrow.  The breathing steroids you will take twice a day.  Take the albuterol NEBULIZER every 4 hours.   PLEASE find immediate new housing. I have passed along the recommendations to our care manager.

## 2023-02-15 NOTE — LETTER
HOUSING ACCOMODATION LETTER    2/15/2023    Re: Keyla Arnold  1985      To Whom It May Concern:     Keyla Arnold was seen in clinic today. She has been under my care since 11/30/2022. During this time, I have evaluated her. Due to her housing conditions of: mold exposure, mouse exposure, her respiratory health has declined significantly requiring medical intervention.    I recommend immediate evacuation to an emergent housing situation. I also recommend intermittent follow up to further evaluate for any other chronic illnesses or exacerbations due to this exposure.     Natasha Dempsey DO  2/15/2023 3:47 PM

## 2023-02-15 NOTE — PROGRESS NOTES
Assessment & Plan     Mold exposure  Chronic, patient with chronic mold exposure in environment with vague joint, respiratory, and constitutional symptoms. Given exposures to other reservoirs of disease (mice and insects), will run broad work up. Ordering a nebulizer due to concern for pneumonitis, asthma exacerbation.   - Nebulizer and Supplies Order for DME - ONLY FOR DME  - CBC with platelets and differential  - Lead Venous Blood Confirm  - Carbon monoxide  - XR Chest 2 Views  - Comprehensive metabolic panel (BMP + Alb, Alk Phos, ALT, AST, Total. Bili, TP)  - ESR: Erythrocyte sedimentation rate  - CRP, inflammation  - CBC with platelets and differential  - Lead Venous Blood Confirm  - Carbon monoxide  - Comprehensive metabolic panel (BMP + Alb, Alk Phos, ALT, AST, Total. Bili, TP)  - ESR: Erythrocyte sedimentation rate  - CRP, inflammation  - albuterol (PROVENTIL) (2.5 MG/3ML) 0.083% neb solution  Dispense: 90 mL; Refill: 0  - predniSONE (DELTASONE) 20 MG tablet  Dispense: 20 tablet; Refill: 0  - budesonide (PULMICORT) 0.5 MG/2ML neb solution  Dispense: 60 mL; Refill: 1    Other contact with mouse, initial encounter  As above.   - Comprehensive metabolic panel (BMP + Alb, Alk Phos, ALT, AST, Total. Bili, TP)  - ESR: Erythrocyte sedimentation rate  - CRP, inflammation  - Comprehensive metabolic panel (BMP + Alb, Alk Phos, ALT, AST, Total. Bili, TP)  - ESR: Erythrocyte sedimentation rate  - CRP, inflammation      Diagnosis or treatment significantly limited by social determinants of health - housing situation  Ordering of each unique test  Prescription drug management  I spent a total of 49 minutes on the day of the visit.   Time spent doing chart review, history and exam, documentation and further activities per the note       Nicotine/Tobacco Cessation:  She reports that she has been smoking cigarettes. She has a 10.00 pack-year smoking history. She has quit using smokeless tobacco.  Nicotine/Tobacco Cessation  "Plan:   Information offered: Patient not interested at this time      BMI:   Estimated body mass index is 39.54 kg/m  as calculated from the following:    Height as of this encounter: 1.651 m (5' 5\").    Weight as of this encounter: 107.8 kg (237 lb 9.6 oz).   Weight management plan: Discussed healthy diet and exercise guidelines    See Patient Instructions    Return in about 3 months (around 5/15/2023) for Follow up, with me.    Natasha Dempsey DO  [facility] JESSICA Ramires is a 37 year old accompanied by her son, presenting for the following health issues:  Cough (Has been coughing and having SOB since 2/15/23), Back Pain (Has been having back pain for 2 weeks ), and Fever (Has been having low grade fever)      Cough    Back Pain   Associated symptoms include a fever.   Fever  Associated symptoms include coughing and a fever.   History of Present Illness       Back Pain:  She presents for follow up of back pain. Patient's back pain is a chronic problem.  Location of back pain:  Right lower back and left lower back  Description of back pain: stabbing  Back pain spreads: right thigh and right knee    Since patient first noticed back pain, pain is: unchanged  Does back pain interfere with her job:  Not applicable      Headaches:   Since the patient's last clinic visit, headaches are: no change  The patient is getting headaches:  Everyday  She is not able to do normal daily activities when she has a migraine.  The patient is taking the following rescue/relief medications:  Tylenol   Patient states \"I get some relief\" from the rescue/relief medications.   The patient is taking the following medications to prevent migraines:  No medications to prevent migraines  In the past 4 weeks, the patient has gone to an Urgent Care or Emergency Room 0 times times due to headaches.    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  She exercises with enough effort to increase her heart rate 5 days per week.   She is taking medications regularly.     Since December. Patient's symptoms worsening and spreading. Patient with cough. Her albuterol does help minimally.     Review of Systems   Constitutional: Positive for fever.   Respiratory: Positive for cough.    Musculoskeletal: Positive for back pain.      Constitutional, HEENT, cardiovascular, pulmonary, gi and gu systems are negative, except as otherwise noted.      Objective    /88 (BP Location: Right arm, Patient Position: Sitting, Cuff Size: Adult Large)   Pulse 87   Resp 18   Ht 1.651 m (5' 5\")   Wt 107.8 kg (237 lb 9.6 oz)   SpO2 98%   Breastfeeding Yes   BMI 39.54 kg/m    Body mass index is 39.54 kg/m .  Physical Exam   GENERAL: healthy, alert and no distress  EYES: Eyes grossly normal to inspection, PERRL and conjunctivae and sclerae normal  HENT: ear canals and TM's normal, nose and mouth without ulcers or lesions  NECK: no adenopathy, no asymmetry, masses, or scars and thyroid normal to palpation  RESP: lungs clear to auscultation - no rales, rhonchi or wheezes and expiratory wheezes throughout  CV: regular rate and rhythm, normal S1 S2, no S3 or S4, no murmur, click or rub, no peripheral edema and peripheral pulses strong  ABDOMEN: soft, nontender, no hepatosplenomegaly, no masses and bowel sounds normal  MS: no gross musculoskeletal defects noted, no edema  SKIN: no suspicious lesions or rashes  PSYCH: mentation appears normal, affect " normal/bright    Results for orders placed or performed during the hospital encounter of 02/15/23   XR Chest 2 Views     Status: None    Narrative    EXAM: XR CHEST 2 VIEWS  LOCATION: [facility]  DATE/TIME: 2/15/2023 2:44 PM    INDICATION:  Mold exposure. Cough.  COMPARISON: None.      Impression    IMPRESSION: Lungs are clear. Heart and pulmonary vascularity are normal. No signs of acute disease.   Results for orders placed or performed in visit on 02/15/23   Lead Venous Blood Confirm     Status: None   Result Value Ref Range    Lead Venous Blood <2.0 <=4.9 ug/dL   Carbon monoxide     Status: Abnormal   Result Value Ref Range    Carbon Monoxide 3.0 (H) 0.0 - 2.0 %   Comprehensive metabolic panel (BMP + Alb, Alk Phos, ALT, AST, Total. Bili, TP)     Status: Abnormal   Result Value Ref Range    Sodium 142 136 - 145 mmol/L    Potassium 4.0 3.4 - 5.3 mmol/L    Chloride 104 98 - 107 mmol/L    Carbon Dioxide (CO2) 27 22 - 29 mmol/L    Anion Gap 11 7 - 15 mmol/L    Urea Nitrogen 8.1 6.0 - 20.0 mg/dL    Creatinine 0.75 0.51 - 0.95 mg/dL    Calcium 9.8 8.6 - 10.0 mg/dL    Glucose 114 (H) 70 - 99 mg/dL    Alkaline Phosphatase 55 35 - 104 U/L    AST 20 10 - 35 U/L    ALT 15 10 - 35 U/L    Protein Total 7.6 6.4 - 8.3 g/dL    Albumin 4.7 3.5 - 5.2 g/dL    Bilirubin Total 0.2 <=1.2 mg/dL    GFR Estimate >90 >60 mL/min/1.73m2   ESR: Erythrocyte sedimentation rate     Status: Normal   Result Value Ref Range    Erythrocyte Sedimentation Rate 14 0 - 20 mm/hr   CRP, inflammation     Status: Abnormal   Result Value Ref Range    CRP Inflammation 35.80 (H) <5.00 mg/L   CBC with platelets and differential     Status: None   Result Value Ref Range    WBC Count 7.6 4.0 - 11.0 10e3/uL    RBC Count 5.06 3.80 - 5.20 10e6/uL    Hemoglobin 13.6 11.7 - 15.7 g/dL    Hematocrit 41.1 35.0 - 47.0 %    MCV 81 78 - 100 fL    MCH 26.9 26.5 - 33.0 pg    MCHC 33.1 31.5 - 36.5 g/dL    RDW 13.9 10.0 - 15.0 %    Platelet Count 209  150 - 450 10e3/uL    % Neutrophils 52 %    % Lymphocytes 35 %    % Monocytes 6 %    % Eosinophils 6 %    % Basophils 0 %    % Immature Granulocytes 1 %    Absolute Neutrophils 4.0 1.6 - 8.3 10e3/uL    Absolute Lymphocytes 2.7 0.8 - 5.3 10e3/uL    Absolute Monocytes 0.4 0.0 - 1.3 10e3/uL    Absolute Eosinophils 0.4 0.0 - 0.7 10e3/uL    Absolute Basophils 0.0 0.0 - 0.2 10e3/uL    Absolute Immature Granulocytes 0.1 <=0.4 10e3/uL   CBC with platelets and differential     Status: None    Narrative    The following orders were created for panel order CBC with platelets and differential.  Procedure                               Abnormality         Status                     ---------                               -----------         ------                     CBC with platelets and d...[[id number]]                      Final result                 Please view results for these tests on the individual orders.

## 2023-02-15 NOTE — RESULT ENCOUNTER NOTE
Results discussed directly with patient while patient was present. Any further details documented in the note.   Natasha Dempsey, DO

## 2023-02-17 LAB — LEAD BLDV-MCNC: <2 UG/DL

## 2023-02-17 NOTE — RESULT ENCOUNTER NOTE
Keyla,    Here are the results of your test.    Your CARBON MONOXIDE is elevated. This is due to your smoking history. If it was from the apartment, we would see a much higher number, and it would effect your son as well.     You do have signs of inflammation. I am not entirely sure if that is because of your symptoms you had that day, but I would like to follow it up with further blood work in about a month if you are better.    Otherwise everything else looks OK.     Hope you got assistance with the housing.    Natasha Dempsey, DO

## 2023-04-27 ENCOUNTER — TELEPHONE (OUTPATIENT)
Dept: FAMILY MEDICINE | Facility: CLINIC | Age: 38
End: 2023-04-27
Payer: COMMERCIAL

## 2023-04-27 NOTE — LETTER
May 1, 2023      Keyla Arnold  [address]  [place] [address]        To Whom It May Concern,     Keyla Arnold is under my care since 11/30/2022. She has a documented allergy to penicillin medications with documented reactions of hives, throat swelling.           Sincerely,        Natasha Dempsey, DO

## 2023-04-27 NOTE — TELEPHONE ENCOUNTER
General Call    Contacts       Type Contact Phone/Fax    04/27/2023 07:50 AM CDT Phone (Incoming) CatalinaKeyla APOLONIA (Self) [phone] ()     Pt  needs signed letter stating she is allergic to penicillin emailed to [email or web address]        Reason for Call:  Pt  needs signed letter stating she is allergic to penicillin emailed to [email or web address]    What are your questions or concerns:  na    Date of last appointment with provider: na    Could we send this information to you in [email or web address] or would you prefer to receive a phone call?:   Patient would prefer a phone call   Okay to leave a detailed message?: Yes at Home number on file [phone] ([place])

## 2023-05-02 ENCOUNTER — OFFICE VISIT (OUTPATIENT)
Dept: URGENT CARE | Facility: URGENT CARE | Age: 38
End: 2023-05-02
Payer: COMMERCIAL

## 2023-05-02 ENCOUNTER — NURSE TRIAGE (OUTPATIENT)
Dept: NURSING | Facility: CLINIC | Age: 38
End: 2023-05-02
Payer: COMMERCIAL

## 2023-05-02 VITALS
TEMPERATURE: 98.3 F | HEART RATE: 67 BPM | BODY MASS INDEX: 36.1 KG/M2 | OXYGEN SATURATION: 98 % | SYSTOLIC BLOOD PRESSURE: 123 MMHG | WEIGHT: 230 LBS | HEIGHT: 67 IN | DIASTOLIC BLOOD PRESSURE: 80 MMHG

## 2023-05-02 DIAGNOSIS — Z77.120 MOLD EXPOSURE: ICD-10-CM

## 2023-05-02 DIAGNOSIS — H60.02 ABSCESS, EARLOBE, LEFT: Primary | ICD-10-CM

## 2023-05-02 PROBLEM — Z87.891 QUIT SMOKING: Status: ACTIVE | Noted: 2020-09-17

## 2023-05-02 PROBLEM — F41.1 GAD (GENERALIZED ANXIETY DISORDER): Status: ACTIVE | Noted: 2017-04-03

## 2023-05-02 PROBLEM — Z86.19 HISTORY OF HERPES GENITALIS: Status: ACTIVE | Noted: 2021-03-16

## 2023-05-02 PROBLEM — F90.0 ADHD (ATTENTION DEFICIT HYPERACTIVITY DISORDER), INATTENTIVE TYPE: Status: ACTIVE | Noted: 2017-02-08

## 2023-05-02 PROBLEM — Z86.59: Status: ACTIVE | Noted: 2017-02-08

## 2023-05-02 PROBLEM — O09.70 SUPERVISION OF HIGH RISK PREGNANCY DUE TO SOCIAL PROBLEMS, ANTEPARTUM: Status: ACTIVE | Noted: 2020-10-13

## 2023-05-02 PROBLEM — G44.40 MEDICATION OVERUSE HEADACHE: Status: ACTIVE | Noted: 2020-10-12

## 2023-05-02 PROBLEM — J45.20 MILD INTERMITTENT ASTHMA WITHOUT COMPLICATION: Status: ACTIVE | Noted: 2020-10-12

## 2023-05-02 PROBLEM — O26.00 EXCESSIVE WEIGHT GAIN AFFECTING PREGNANCY: Status: ACTIVE | Noted: 2020-10-13

## 2023-05-02 PROBLEM — Z87.410 HISTORY OF CERVICAL DYSPLASIA: Status: ACTIVE | Noted: 2018-11-19

## 2023-05-02 PROBLEM — O09.299 PRIOR FETAL MACROSOMIA, ANTEPARTUM: Status: ACTIVE | Noted: 2020-09-17

## 2023-05-02 PROBLEM — O09.299 HISTORY OF SHOULDER DYSTOCIA IN PRIOR PREGNANCY, CURRENTLY PREGNANT: Status: ACTIVE | Noted: 2020-09-17

## 2023-05-02 PROBLEM — O09.90 HIGH RISK PREGNANCY, ANTEPARTUM: Status: ACTIVE | Noted: 2020-09-17

## 2023-05-02 PROBLEM — G43.719 INTRACTABLE CHRONIC MIGRAINE WITHOUT AURA AND WITHOUT STATUS MIGRAINOSUS: Status: ACTIVE | Noted: 2020-10-12

## 2023-05-02 PROBLEM — F43.12 PROLONGED POSTTRAUMATIC STRESS DISORDER: Status: ACTIVE | Noted: 2017-10-16

## 2023-05-02 PROCEDURE — 99214 OFFICE O/P EST MOD 30 MIN: CPT | Performed by: NURSE PRACTITIONER

## 2023-05-02 RX ORDER — SULFAMETHOXAZOLE/TRIMETHOPRIM 800-160 MG
1 TABLET ORAL 2 TIMES DAILY
Qty: 20 TABLET | Refills: 0 | Status: SHIPPED | OUTPATIENT
Start: 2023-05-02 | End: 2023-05-12

## 2023-05-02 NOTE — PROGRESS NOTES
Chief Complaint   Patient presents with     Urgent Care     Pt in clinic to have eval for left ear mass.     Mass     SUBJECTIVE:  Keyla Arnold is a 37 year old female who presents to the clinic today with a pimple to her posterior left ear auricle noticed in the last day.  Increasing redness tenderness swelling.  No fever sweats chills purulence in her ear infection or drainage.  Some trismus when she opens her mouth.  No bony tenderness over the mastoid.  Has not done warm wet compress for this.  Also hoping for allergy testing for mold.  She was notified that there is mold in her current living situation.  Struggles with asthma allergies baseline.    No past medical history on file.  albuterol (PROVENTIL) (2.5 MG/3ML) 0.083% neb solution, Take 1 vial (2.5 mg) by nebulization 4 times daily  predniSONE (DELTASONE) 20 MG tablet, Take 3 tabs by mouth daily x 3 days, then 2 tabs daily x 3 days, then 1 tab daily x 3 days, then 1/2 tab daily x 3 days.  albuterol (PROAIR HFA/PROVENTIL HFA/VENTOLIN HFA) 108 (90 BASE) MCG/ACT Inhaler, Inhale 2 puffs into the lungs (Patient not taking: Reported on 5/2/2023)  budesonide (PULMICORT) 0.5 MG/2ML neb solution, Take 2 mLs (0.5 mg) by nebulization 2 times daily (Patient not taking: Reported on 5/2/2023)  cholecalciferol 25 MCG (1000 UT) TABS, Take 1 tablet by mouth daily (Patient not taking: Reported on 5/2/2023)  eszopiclone (LUNESTA) 2 MG tablet, Take 1 tablet by mouth At Bedtime (Patient not taking: Reported on 5/2/2023)  fluticasone-salmeterol (ADVAIR HFA) 230-21 MCG/ACT inhaler, Inhale 2 puffs into the lungs daily (Patient not taking: Reported on 5/2/2023)  lidocaine (LIDODERM) 5 % patch, Place 1 patch onto the skin every 24 hours To prevent lidocaine toxicity, patient should be patch free for 12 hrs daily. (Patient not taking: Reported on 5/2/2023)  norelgestromin-ethinyl estradiol (ORTHO EVRA) 150-35 MCG/24HR patch, 1 patch (Patient not taking: Reported on  "5/2/2023)  VENTOLIN  (90 BASE) MCG/ACT Inhaler,     No current facility-administered medications on file prior to visit.    Social History     Tobacco Use     Smoking status: Every Day     Packs/day: 0.50     Years: 20.00     Pack years: 10.00     Types: Cigarettes     Smokeless tobacco: Former   Vaping Use     Vaping status: Some Days     Substances: Nicotine, Flavoring     Devices: Disposable   Substance Use Topics     Alcohol use: No     Allergies   Allergen Reactions     Peanut-Containing Drug Products Headache and Hives     Penicillins Shortness Of Breath and Anaphylaxis     Latex      PN: Converted from LW Latex Sensitivity Flag     No Clinical Screening - See Comments Other (See Comments)     PN: LW CM1: >>> NO CONTRAST ADVERSE REACTION <<<     Reaction :  migraine     Lavender Oil Rash       Review of Systems   All systems negative except for those listed above in HPI.    EXAM:   /80   Pulse 67   Temp 98.3  F (36.8  C) (Oral)   Ht 1.702 m (5' 7\")   Wt 104.3 kg (230 lb)   SpO2 98%   BMI 36.02 kg/m      Physical Exam  Vitals reviewed.   Constitutional:       Appearance: Normal appearance.   HENT:      Head: Normocephalic and atraumatic.        Right Ear: Tympanic membrane and ear canal normal.      Left Ear: Tympanic membrane and ear canal normal.      Ears:        Comments: External left ear with pre and posterior auricular lymphadenopathy tenderness erythema edema and a pinpoint firm pimple at the posterior lobe.  There is no mastoid tenderness or involvement.     Nose: Nose normal. No congestion or rhinorrhea.      Mouth/Throat:      Mouth: Mucous membranes are moist.      Pharynx: Oropharynx is clear.   Eyes:      Extraocular Movements: Extraocular movements intact.      Pupils: Pupils are equal, round, and reactive to light.   Cardiovascular:      Rate and Rhythm: Normal rate.      Pulses: Normal pulses.   Pulmonary:      Effort: Pulmonary effort is normal.   Musculoskeletal:         " General: Normal range of motion.      Cervical back: Normal range of motion and neck supple. No rigidity or tenderness.   Lymphadenopathy:      Cervical: No cervical adenopathy.   Skin:     General: Skin is warm and dry.      Findings: No rash.   Neurological:      General: No focal deficit present.      Mental Status: She is alert and oriented to person, place, and time.   Psychiatric:         Mood and Affect: Mood normal.         Behavior: Behavior normal.       ASSESSMENT:    ICD-10-CM    1. Abscess, earlobe, left  H60.02 sulfamethoxazole-trimethoprim (BACTRIM DS) 800-160 MG tablet      2. Mold exposure  Z77.120 Adult Allergy/Asthma Referral        PLAN:    Bactrim for left earlobe abscess cellulitis  Abscess in not fluctuant for I&D today  No direct mastoid tenderness  Epsom salt warm soapy water compress  Reevaluation at ER if worsening in 2-3 days such as fever vomiting rapid increasing redness pain over bone  Follow up with primary care provider with any problems, questions or concerns or if symptoms worsen or fail to improve. Patient agreed to plan and verbalized understanding.    Ceci Maldonado, DAWIT-BC  [facility] [place]

## 2023-05-02 NOTE — TELEPHONE ENCOUNTER
"Pt calls to report \"Lump behind ear.\"  First observed 24 hours ago.  \"Small at first.\"  \"Last evening was bigger.\"  Today lump \"extends from behind ear around to front of ear.\"    History of pimples around ear lobe.  Yesterday \"Was told it has a red dot in the center yesterday.\"  Pt tried to squeeze the area last night.  No drainage.  Now \"painful to touch.\"    Uncertain if insect bite.  Currently living at a shelter.  No other evidence of insect bite.  Suspicion of possible boil or infected cyst/cellulitis.    Advised prompt urgent care eval.  Pt agrees to plan.  Reports [facility] is closest to her location.  Will seek eval there today.    Chelsi OBANDO Health Nurse Advisor     Reason for Disposition    Swelling is painful to touch and no fever    Additional Information    Negative: Sounds like a life-threatening emergency to the triager    Negative: Small growth, spot, bump, or pigmented area of skin (e.g., moles, skin tags, wart, melanoma, skin cancer)    Negative: Followed a skin injury    Negative: Follows an insect bite    Negative: Swelling of lymph node suspected    Negative: Swelling of vaccination site    Negative: Swelling of entire face    Negative: Hernia suspected (bulge in groin or abdomen) and painful or vomiting    Negative: Swollen lump in groin and pulsating (like heartbeat)    Negative: Patient sounds very sick or weak to the triager    Negative: SEVERE pain (e.g., excruciating)    Negative: Swelling is painful to touch AND fever    Negative: Swelling is red and fever    Negative: Swelling is red and size > 2 inches (5.0 cm)    Protocols used: SKIN LUMP OR LOCALIZED SWELLING-A-OH      "

## 2023-05-02 NOTE — PATIENT INSTRUCTIONS
Bactrim for left earlobe abscess cellulitis  Abscess in not fluctuant for I&D today  No direct mastoid tenderness  Epsom salt warm soapy water compress  Reevaluation at ER if worsening in 2-3 days such as fever vomiting rapid increasing redness pain over bone

## 2023-05-03 ENCOUNTER — TELEPHONE (OUTPATIENT)
Dept: NURSING | Facility: CLINIC | Age: 38
End: 2023-05-03
Payer: COMMERCIAL

## 2023-05-03 NOTE — TELEPHONE ENCOUNTER
Pt calling stating that she is having difficulty taking her recently prescribed antibiotics.    Pt was seen yesterday at the [facility] and prescribed antibiotic for an abscess behind her left ear. The abx is in pill form.    Pt states that she currently lives in a shelter and they will only give her water to take her medication with. She says that she has a difficult time drinking water because it makes her gag. She has requested to take her medication with juice, but the shelter will not give her anything but water without a doctor's note stating she requires something else. She gags on liquid medication, so does not want it changed to liquid form.    Pt is requesting a letter sent via [employer], stating that she needs to have a small cup of juice to take her medication for the next 10 days. Will route message to PCP/clinic with this request. No triage provided.    Adriana Mota, RN, BSN  [facility]   Triage Nurse Advisor

## 2023-05-17 ENCOUNTER — TELEPHONE (OUTPATIENT)
Dept: FAMILY MEDICINE | Facility: CLINIC | Age: 38
End: 2023-05-17
Payer: COMMERCIAL

## 2023-05-17 NOTE — TELEPHONE ENCOUNTER
Forms have been placed on PCP's desk for review and signature.      Remington Cowan Jr., CMA on 5/17/2023 at 1:10 PM

## 2023-05-17 NOTE — TELEPHONE ENCOUNTER
Forms/Letter Request    Type of form/letter: Housing    Have you been seen for this request: N/A    Do we have the form/letter: Yes:     Who is the form from? Shelter faxed form on 05/10/2023 to  - received confirmation fax was transmitted.     Where did/will the form come from? form was faxed in 5/10/2023    When is form/letter needed by:  As soon as feasible to secure housing for patient.     How would you like the form/letter returned: Fax : Attn.: Kiran [facility]     Patient Notified form requests are processed in 3-5 business days:Yes    Could we send this information to you in wunderloop or would you prefer to receive a phone call?:   Patient would prefer a phone call   Okay to leave a detailed message?: Yes at Cell number on file:    Telephone Information:   Mobile [phone]

## 2023-05-23 ENCOUNTER — OFFICE VISIT (OUTPATIENT)
Dept: FAMILY MEDICINE | Facility: CLINIC | Age: 38
End: 2023-05-23
Payer: COMMERCIAL

## 2023-05-23 VITALS
WEIGHT: 231.2 LBS | HEART RATE: 74 BPM | OXYGEN SATURATION: 98 % | RESPIRATION RATE: 22 BRPM | SYSTOLIC BLOOD PRESSURE: 122 MMHG | HEIGHT: 65 IN | DIASTOLIC BLOOD PRESSURE: 68 MMHG | BODY MASS INDEX: 38.52 KG/M2

## 2023-05-23 DIAGNOSIS — F90.0 ADHD (ATTENTION DEFICIT HYPERACTIVITY DISORDER), INATTENTIVE TYPE: ICD-10-CM

## 2023-05-23 DIAGNOSIS — Z00.00 ROUTINE GENERAL MEDICAL EXAMINATION AT A HEALTH CARE FACILITY: Primary | ICD-10-CM

## 2023-05-23 DIAGNOSIS — G44.001 CLUSTER HEADACHE, INTRACTABLE: ICD-10-CM

## 2023-05-23 DIAGNOSIS — J45.42 MODERATE PERSISTENT ASTHMA WITH STATUS ASTHMATICUS: ICD-10-CM

## 2023-05-23 DIAGNOSIS — Z86.59: ICD-10-CM

## 2023-05-23 PROCEDURE — 90471 IMMUNIZATION ADMIN: CPT | Performed by: FAMILY MEDICINE

## 2023-05-23 PROCEDURE — 99395 PREV VISIT EST AGE 18-39: CPT | Mod: 25 | Performed by: FAMILY MEDICINE

## 2023-05-23 PROCEDURE — 99214 OFFICE O/P EST MOD 30 MIN: CPT | Mod: 25 | Performed by: FAMILY MEDICINE

## 2023-05-23 PROCEDURE — 90677 PCV20 VACCINE IM: CPT | Performed by: FAMILY MEDICINE

## 2023-05-23 RX ORDER — FLUTICASONE PROPIONATE AND SALMETEROL 100; 50 UG/1; UG/1
1 POWDER RESPIRATORY (INHALATION) EVERY 12 HOURS
Qty: 60 EACH | Refills: 1 | Status: SHIPPED | OUTPATIENT
Start: 2023-05-23 | End: 2024-04-08

## 2023-05-23 RX ORDER — SUMATRIPTAN 50 MG/1
50 TABLET, FILM COATED ORAL
Qty: 16 TABLET | Refills: 0 | Status: SHIPPED | OUTPATIENT
Start: 2023-05-23 | End: 2024-04-08

## 2023-05-23 ASSESSMENT — PATIENT HEALTH QUESTIONNAIRE - PHQ9
SUM OF ALL RESPONSES TO PHQ QUESTIONS 1-9: 10
SUM OF ALL RESPONSES TO PHQ QUESTIONS 1-9: 10
10. IF YOU CHECKED OFF ANY PROBLEMS, HOW DIFFICULT HAVE THESE PROBLEMS MADE IT FOR YOU TO DO YOUR WORK, TAKE CARE OF THINGS AT HOME, OR GET ALONG WITH OTHER PEOPLE: SOMEWHAT DIFFICULT

## 2023-05-23 ASSESSMENT — ENCOUNTER SYMPTOMS
EYE PAIN: 0
FREQUENCY: 0
HEADACHES: 1
COUGH: 0
BREAST MASS: 0
CHILLS: 0
MYALGIAS: 1
ARTHRALGIAS: 1
HEARTBURN: 0
WEAKNESS: 0
SORE THROAT: 0
NAUSEA: 1
CONSTIPATION: 0
NERVOUS/ANXIOUS: 1
DYSURIA: 0
PALPITATIONS: 0
DIARRHEA: 0
PARESTHESIAS: 0
JOINT SWELLING: 0
FEVER: 0
HEMATURIA: 0
HEMATOCHEZIA: 0
DIZZINESS: 0
SHORTNESS OF BREATH: 0
ABDOMINAL PAIN: 0

## 2023-05-23 ASSESSMENT — ASTHMA QUESTIONNAIRES
ACT_TOTALSCORE: 9
QUESTION_4 LAST FOUR WEEKS HOW OFTEN HAVE YOU USED YOUR RESCUE INHALER OR NEBULIZER MEDICATION (SUCH AS ALBUTEROL): ONE OR TWO TIMES PER DAY
ACT_TOTALSCORE: 9
QUESTION_1 LAST FOUR WEEKS HOW MUCH OF THE TIME DID YOUR ASTHMA KEEP YOU FROM GETTING AS MUCH DONE AT WORK, SCHOOL OR AT HOME: MOST OF THE TIME
QUESTION_5 LAST FOUR WEEKS HOW WOULD YOU RATE YOUR ASTHMA CONTROL: POORLY CONTROLLED
QUESTION_3 LAST FOUR WEEKS HOW OFTEN DID YOUR ASTHMA SYMPTOMS (WHEEZING, COUGHING, SHORTNESS OF BREATH, CHEST TIGHTNESS OR PAIN) WAKE YOU UP AT NIGHT OR EARLIER THAN USUAL IN THE MORNING: TWO OR THREE NIGHTS A WEEK
QUESTION_2 LAST FOUR WEEKS HOW OFTEN HAVE YOU HAD SHORTNESS OF BREATH: MORE THAN ONCE A DAY

## 2023-05-23 NOTE — PROGRESS NOTES
SUBJECTIVE:   CC: Keyla is an 37 year old who presents for preventive health visit.        View : No data to display.              Patient has been advised of split billing requirements and indicates understanding: Yes  Healthy Habits:     Getting at least 3 servings of Calcium per day:  NO    Bi-annual eye exam:  Yes    Dental care twice a year:  NO    Sleep apnea or symptoms of sleep apnea:  Daytime drowsiness, Excessive snoring and Sleep apnea    Diet:  Regular (no restrictions)    Frequency of exercise:  4-5 days/week    Duration of exercise:  15-30 minutes    Medication side effects:  Not applicable    PHQ-2 Total Score: 4    Additional concerns today:  Yes    Choking with water  -Patient reports she regurgitates it, and causes a stomach ache.   -Does not occur with any other water  -Patient is unable to bring in additional ingredients due to housing situation.     Disability Paperwork  -Patient has a history of bipolar disorder     Asthma/Running out breath  -Occurs with running  -Patient reports smoking is worsening her symptoms  -Patient reports her flare ups are worse with it.   -Patient has been diagnosed with asthma since high[employer]godeusool   -Patient has most of her things out of her apartment.    Patient is grieving due to her mother passing away of heart failure.      Patient reports her bipolar worsens with OCPS. She would do triphasic?     {Provider Documentation    Today's PHQ-2 Score:       5/23/2023     1:44 PM   PHQ-2 ( 1999 [employer])   Q1: Little interest or pleasure in doing things 1   Q2: Feeling down, depressed or hopeless 3   PHQ-2 Score 4   Q1: Little interest or pleasure in doing things Several days   Q2: Feeling down, depressed or hopeless Nearly every day   PHQ-2 Score 4       Social History     Tobacco Use     Smoking status: Every Day     Packs/day: 0.50     Years: 20.00     Pack years: 10.00     Types: Cigarettes     Smokeless tobacco: Former   Vaping Use     Vaping status: Some Days      Substances: Nicotine, Flavoring     Devices: Disposable   Substance Use Topics     Alcohol use: No         2023     1:43 PM   Alcohol Use   Prescreen: >3 drinks/day or >7 drinks/week? Not Applicable     Reviewed orders with patient.  Reviewed health maintenance and updated orders accordingly - Yes  Labs reviewed in EPIC    Breast Cancer Screenin/30/2022     2:39 PM   Breast CA Risk Assessment (FHS-7)   Do you have a family history of breast, colon, or ovarian cancer? No / Unknown       Patient under 40 years of age: Routine Mammogram Screening not recommended.   Pertinent mammograms are reviewed under the imaging tab.    History of abnormal Pap smear: Last 3 Pap Results: No results found for: PAP     Reviewed and updated as needed this visit by clinical staff   Tobacco  Allergies  Meds  Problems  Med Hx  Surg Hx  Fam Hx          Reviewed and updated as needed this visit by Provider   Tobacco  Allergies  Meds  Problems  Med Hx  Surg Hx  Fam Hx           Review of Systems   Constitutional: Negative for chills and fever.   HENT: Negative for congestion, ear pain, hearing loss and sore throat.    Eyes: Negative for pain and visual disturbance.   Respiratory: Negative for cough and shortness of breath.    Cardiovascular: Positive for chest pain and peripheral edema. Negative for palpitations.   Gastrointestinal: Positive for nausea. Negative for abdominal pain, constipation, diarrhea, heartburn and hematochezia.   Breasts:  Negative for tenderness, breast mass and discharge.   Genitourinary: Negative for dysuria, frequency, genital sores, hematuria, pelvic pain, urgency, vaginal bleeding and vaginal discharge.   Musculoskeletal: Positive for arthralgias and myalgias. Negative for joint swelling.   Skin: Negative for rash.   Neurological: Positive for headaches. Negative for dizziness, weakness and paresthesias.   Psychiatric/Behavioral: Positive for mood changes. The patient is  "nervous/anxious.       OBJECTIVE:   /68 (BP Location: Right arm, Patient Position: Sitting, Cuff Size: Adult Regular)   Pulse 74   Resp 22   Ht 1.651 m (5' 5\")   Wt 104.9 kg (231 lb 3.2 oz)   LMP 05/14/2023 (Approximate)   SpO2 98%   Breastfeeding Yes   BMI 38.47 kg/m    Physical Exam  GENERAL: healthy, alert and no distress  EYES: Eyes grossly normal to inspection, PERRL and conjunctivae and sclerae normal  NECK: no adenopathy, no asymmetry, masses, or scars and thyroid normal to palpation  RESP: expiratory wheezes throughout  CV: regular rate and rhythm, normal S1 S2, no S3 or S4, no murmur, click or rub, no peripheral edema and peripheral pulses strong  MS: no gross musculoskeletal defects noted, no edema  SKIN: no suspicious lesions or rashes  PSYCH: Patient tangential, anxious, labile mood.    Diagnostic Test Results:  none     ASSESSMENT/PLAN:   Keyla was seen today for physical.    Diagnoses and all orders for this visit:    Routine general medical examination at a health care facility  Patient here for routine preventative care. Most lab work completed, up to date on vaccinations. Visit focused mostly on SDOH- in finding a patient housing.   -     REVIEW OF HEALTH MAINTENANCE PROTOCOL ORDERS  -     PRIMARY CARE FOLLOW-UP SCHEDULING; Future    ADHD (attention deficit hyperactivity disorder), inattentive type  Chronic, not well controlled. Would be difficult to recommend stimulant given housing situation (unable to lock medicine up) as well as patient's difficulty locking up medications. Could benefit however, if in a facility where it was kept separate from her child.     Impaired verbal expression in adult  Chronic, patient is on disability for developmental delay and language impairment. Agree with assessment, however, recommend up to date testing as last exam found was in 2013.     Moderate persistent asthma with status asthmaticus  Chronic, not well controlled. Patient having flares to the " point where she is unable to keep up with child. Discussed multiple triggers and that housing situation likely causing flares. Discussed importance of daily ICS-LABA.   -     fluticasone-salmeterol (ADVAIR) 100-50 MCG/ACT inhaler; Inhale 1 puff into the lungs every 12 hours  -     Pneumococcal 20 Valent Conjugate (Prevnar 20)    Cluster headache, intractable  Chronic, not well controlled. Patient diagnosed with migraines versus cluster headaches. Discussed that rescue treatment is similar, and recommend patient go back on imitrex. She is breastfeeding, given limited bioavailability, discussed risks and benefits but if patient is uncomfortable, can defer from breast feeding 12 hours after medication dose last taken. Patient agreeable.   -     Adult Neurology  Referral; Future  -     SUMAtriptan (IMITREX) 50 MG tablet; Take 1 tablet (50 mg) by mouth at onset of headache for migraine OK to take one more tablet after two hours if still with headache. OK to breastfeed 12 hours after last dose.      Patient has been advised of split billing requirements and indicates understanding: Yes      COUNSELING:  Reviewed preventive health counseling, as reflected in patient instructions        She reports that she has been smoking cigarettes. She has a 10.00 pack-year smoking history. She has quit using smokeless tobacco.  Nicotine/Tobacco Cessation Plan:   Information offered: Patient not interested at this time    Natasha Dempsey [facility]  Answers for HPI/ROS submitted by the patient on 5/23/2023  If you checked off any problems, how difficult have these problems made it for you to do your work, take care of things at home, or get along with other people?: Somewhat difficult  PHQ9 TOTAL SCORE: 10

## 2023-05-23 NOTE — LETTER
My Asthma Action Plan    Name: Keyla Arnold   YOB: 1985  Date: 5/23/2023   My doctor: Natasha Dempsey, DO   My clinic: [facility]        My Control Medicine: Fluticasone propionate + salmeterol (Advair HFA) -  230/21 mcg 2 puffs twice daily  My Rescue Medicine: Albuterol (Proair/Ventolin/Proventil HFA) 2-4 puffs EVERY 4 HOURS as needed. Use a spacer if recommended by your provider.  My Oral Steroid Medicine: Prednisone, but you must message Dr. Dempsey My Asthma Severity:   Moderate Persistent  Know your asthma triggers: smoke, upper respiratory infections, dust mites, pollens, and humidity                GREEN ZONE   Good Control  I feel good  No cough or wheeze  Can work, sleep and play without asthma symptoms       Take your asthma control medicine every day.     If exercise triggers your asthma, take your rescue medication  15 minutes before exercise or sports, and  During exercise if you have asthma symptoms  Spacer to use with inhaler: If you have a spacer, make sure to use it with your inhaler             YELLOW ZONE Getting Worse  I have ANY of these:  I do not feel good  Cough or wheeze  Chest feels tight  Wake up at night   Keep taking your Green Zone medications  Start taking your rescue medicine:  every 20 minutes for up to 1 hour. Then every 4 hours for 24-48 hours.  If you stay in the Yellow Zone for more than 12-24 hours, contact your doctor.  If you do not return to the Green Zone in 12-24 hours or you get worse, start taking your oral steroid medicine if prescribed by your provider.           RED ZONE Medical Alert - Get Help  I have ANY of these:  I feel awful  Medicine is not helping  Breathing getting harder  Trouble walking or talking  Nose opens wide to breathe       Take your rescue medicine NOW  If your provider has prescribed an oral steroid medicine, start taking it NOW  Call your doctor NOW  If you are still in the Red Zone after 20 minutes and you  have not reached your doctor:  Take your rescue medicine again and  Call 911 or go to the emergency room right away    See your regular doctor within 2 weeks of an Emergency Room or Urgent Care visit for follow-up treatment.          Annual Reminders:  Meet with Asthma Educator,  Flu Shot in the Fall, consider Pneumonia Vaccination for patients with asthma (aged 19 and older).    Pharmacy:    [address]      Electronically signed by Natasha Dempsey, DO   Date: 05/23/23                      Asthma Triggers  How To Control Things That Make Your Asthma Worse    Triggers are things that make your asthma worse.  Look at the list below to help you find your triggers and what you can do about them.  You can help prevent asthma flare-ups by staying away from your triggers.      Trigger                                                          What you can do   Cigarette Smoke  Tobacco smoke can make asthma worse. Do not allow smoking in your home, car or around you.  Be sure no one smokes at a child s day care or school.  If you smoke, ask your health care provider for ways to help you quit.  Ask family members to quit too.  Ask your health care provider for a referral to Quit Plan to help you quit smoking, or call 9-281-344-PLAN.     Colds, Flu, Bronchitis  These are common triggers of asthma. Wash your hands often.  Don t touch your eyes, nose or mouth.  Get a flu shot every year.     Dust Mites  These are tiny bugs that live in cloth or carpet. They are too small to see. Wash sheets and blankets in hot water every week.   Encase pillows and mattress in dust mite proof covers.  Avoid having carpet if you can. If you have carpet, vacuum weekly.   Use a dust mask and HEPA vacuum.   Pollen and Outdoor Mold  Some people are allergic to trees, grass, or weed pollen, or molds. Try to keep your windows closed.  Limit time out doors when pollen count is high.   Ask you  health care provider about taking medicine during allergy season.     Animal Dander  Some people are allergic to skin flakes, urine or saliva from pets with fur or feathers. Keep pets with fur or feathers out of your home.    If you can t keep the pet outdoors, then keep the pet out of your bedroom.  Keep the bedroom door closed.  Keep pets off cloth furniture and away from stuffed toys.     Mice, Rats, and Cockroaches   Some people are allergic to the waste from these pests.   Cover food and garbage.  Clean up spills and food crumbs.  Store grease in the refrigerator.   Keep food out of the bedroom.   Indoor Mold  This can be a trigger if your home has high moisture. Fix leaking faucets, pipes, or other sources of water.   Clean moldy surfaces.  Dehumidify basement if it is damp and smelly.   Smoke, Strong Odors, and Sprays  These can reduce air quality. Stay away from strong odors and sprays, such as perfume, powder, hair spray, paints, smoke incense, paint, cleaning products, candles and new carpet.   Exercise or Sports  Some people with asthma have this trigger. Be active!  Ask your doctor about taking medicine before sports or exercise to prevent symptoms.    Warm up for 5-10 minutes before and after sports or exercise.     Other Triggers of Asthma  Cold air:  Cover your nose and mouth with a scarf.  Sometimes laughing or crying can be a trigger.  Some medicines and food can trigger asthma.

## 2023-06-02 ENCOUNTER — NURSE TRIAGE (OUTPATIENT)
Dept: NURSING | Facility: CLINIC | Age: 38
End: 2023-06-02

## 2023-06-02 ENCOUNTER — OFFICE VISIT (OUTPATIENT)
Dept: URGENT CARE | Facility: URGENT CARE | Age: 38
End: 2023-06-02
Payer: COMMERCIAL

## 2023-06-02 VITALS
WEIGHT: 231 LBS | SYSTOLIC BLOOD PRESSURE: 116 MMHG | DIASTOLIC BLOOD PRESSURE: 65 MMHG | HEART RATE: 57 BPM | BODY MASS INDEX: 38.44 KG/M2 | OXYGEN SATURATION: 99 % | TEMPERATURE: 97.3 F

## 2023-06-02 DIAGNOSIS — M54.2 NECK PAIN ON RIGHT SIDE: Primary | ICD-10-CM

## 2023-06-02 DIAGNOSIS — S16.1XXA STRAIN OF NECK MUSCLE, INITIAL ENCOUNTER: ICD-10-CM

## 2023-06-02 PROCEDURE — 96372 THER/PROPH/DIAG INJ SC/IM: CPT | Performed by: PHYSICIAN ASSISTANT

## 2023-06-02 PROCEDURE — 99213 OFFICE O/P EST LOW 20 MIN: CPT | Mod: 25 | Performed by: PHYSICIAN ASSISTANT

## 2023-06-02 RX ORDER — NAPROXEN 500 MG/1
500 TABLET ORAL 2 TIMES DAILY WITH MEALS
Qty: 14 TABLET | Refills: 0 | Status: SHIPPED | OUTPATIENT
Start: 2023-06-02 | End: 2023-06-09

## 2023-06-02 RX ORDER — ACETAMINOPHEN 500 MG
500-1000 TABLET ORAL EVERY 6 HOURS PRN
Qty: 180 TABLET | Refills: 0 | Status: SHIPPED | OUTPATIENT
Start: 2023-06-02 | End: 2023-07-02

## 2023-06-02 RX ORDER — KETOROLAC TROMETHAMINE 30 MG/ML
60 INJECTION, SOLUTION INTRAMUSCULAR; INTRAVENOUS ONCE
Status: COMPLETED | OUTPATIENT
Start: 2023-06-02 | End: 2023-06-02

## 2023-06-02 RX ADMIN — KETOROLAC TROMETHAMINE 60 MG: 30 INJECTION, SOLUTION INTRAMUSCULAR; INTRAVENOUS at 14:04

## 2023-06-02 ASSESSMENT — ENCOUNTER SYMPTOMS
NECK PAIN: 1
NECK STIFFNESS: 1

## 2023-06-02 NOTE — PATIENT INSTRUCTIONS
Start taking Naproxen twice a day with meals for pain  Alternate with acetaminophen as needed for pain  Follow dosing instructions in the AVS
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
## 2023-08-29 ENCOUNTER — OFFICE VISIT (OUTPATIENT)
Dept: FAMILY MEDICINE | Facility: CLINIC | Age: 38
End: 2023-08-29
Payer: COMMERCIAL

## 2023-08-29 VITALS
WEIGHT: 213 LBS | BODY MASS INDEX: 35.45 KG/M2 | SYSTOLIC BLOOD PRESSURE: 110 MMHG | DIASTOLIC BLOOD PRESSURE: 69 MMHG | TEMPERATURE: 98.7 F | OXYGEN SATURATION: 98 % | HEART RATE: 72 BPM | RESPIRATION RATE: 18 BRPM

## 2023-08-29 DIAGNOSIS — R42 DIZZINESS: ICD-10-CM

## 2023-08-29 DIAGNOSIS — N89.8 VAGINAL ITCHING: ICD-10-CM

## 2023-08-29 DIAGNOSIS — B96.89 BACTERIAL VAGINOSIS: Primary | ICD-10-CM

## 2023-08-29 DIAGNOSIS — Z86.39 HISTORY OF HYPOGLYCEMIA: ICD-10-CM

## 2023-08-29 DIAGNOSIS — N76.0 BACTERIAL VAGINOSIS: Primary | ICD-10-CM

## 2023-08-29 LAB
ATRIAL RATE - MUSE: 72 BPM
CLUE CELLS: PRESENT
DIASTOLIC BLOOD PRESSURE - MUSE: NORMAL MMHG
GLUCOSE BLD-MCNC: 85 MG/DL (ref 60–99)
INTERPRETATION ECG - MUSE: NORMAL
P AXIS - MUSE: 54 DEGREES
PR INTERVAL - MUSE: 152 MS
QRS DURATION - MUSE: 76 MS
QT - MUSE: 400 MS
QTC - MUSE: 438 MS
R AXIS - MUSE: 2 DEGREES
SYSTOLIC BLOOD PRESSURE - MUSE: NORMAL MMHG
T AXIS - MUSE: 21 DEGREES
TRICHOMONAS, WET PREP: ABNORMAL
VENTRICULAR RATE- MUSE: 72 BPM
WBC'S/HIGH POWER FIELD, WET PREP: ABNORMAL
YEAST, WET PREP: ABNORMAL

## 2023-08-29 PROCEDURE — 99214 OFFICE O/P EST MOD 30 MIN: CPT | Mod: 25 | Performed by: NURSE PRACTITIONER

## 2023-08-29 PROCEDURE — 82947 ASSAY GLUCOSE BLOOD QUANT: CPT | Performed by: NURSE PRACTITIONER

## 2023-08-29 PROCEDURE — 36415 COLL VENOUS BLD VENIPUNCTURE: CPT | Performed by: NURSE PRACTITIONER

## 2023-08-29 PROCEDURE — 93005 ELECTROCARDIOGRAM TRACING: CPT | Performed by: NURSE PRACTITIONER

## 2023-08-29 PROCEDURE — 93010 ELECTROCARDIOGRAM REPORT: CPT | Performed by: INTERNAL MEDICINE

## 2023-08-29 PROCEDURE — 87210 SMEAR WET MOUNT SALINE/INK: CPT | Performed by: NURSE PRACTITIONER

## 2023-08-29 RX ORDER — METRONIDAZOLE 7.5 MG/G
1 GEL VAGINAL DAILY
Qty: 25 G | Refills: 0 | Status: SHIPPED | OUTPATIENT
Start: 2023-08-29 | End: 2023-09-03

## 2023-08-29 RX ORDER — LANCETS
EACH MISCELLANEOUS
Qty: 100 EACH | Refills: 6 | Status: SHIPPED | OUTPATIENT
Start: 2023-08-29 | End: 2024-04-08

## 2023-08-29 ASSESSMENT — ENCOUNTER SYMPTOMS
DYSURIA: 0
FREQUENCY: 1

## 2023-08-29 NOTE — PROGRESS NOTES
Assessment & Plan     Dizziness    - Glucose, whole blood  - EKG 12-lead, tracing only  - Glucose, whole blood  - blood glucose monitoring (NO BRAND SPECIFIED) meter device kit  Dispense: 1 kit; Refill: 0  - blood glucose (NO BRAND SPECIFIED) test strip  Dispense: 100 strip; Refill: 0  - thin (NO BRAND SPECIFIED) lancets  Dispense: 100 each; Refill: 6    Vaginal itching    - Wet prep    Bacterial vaginosis    - metroNIDAZOLE (METROGEL) 0.75 % vaginal gel  Dispense: 25 g; Refill: 0    History of hypoglycemia    - blood glucose monitoring (NO BRAND SPECIFIED) meter device kit  Dispense: 1 kit; Refill: 0  - blood glucose (NO BRAND SPECIFIED) test strip  Dispense: 100 strip; Refill: 0  - thin (NO BRAND SPECIFIED) lancets  Dispense: 100 each; Refill: 6     Patient with a history of hypoglycemia while pregnant with symptoms that would be consistent with hypoglycemia including pale base, dizziness, headache that resolves with eating sugary snacks.  He is staying in a shelter and is not allowed to keep food in her room.  Did provide a letter to compel them to allow her to keep snacks or glucose tablets available.  Did provide blood sugar testing equipment.  Can test when symptoms are occurring and record number.  Call PCP if number is under 70 after eating.    No symptoms here.  Blood sugar here is 85 after eating.      Is having some right-sided chest pain.  Very tender in this area.  Most likely chest wall pain.  Normal EKG.  No other symptoms such as shortness of breath or palpitations.  Symptomatic care with warm packs and Tylenol discussed.    Vaginal irritation with + clue cells and history of BV.  Is breast-feeding.  Will do vaginal gel as above.  Recheck if not better in a week.    Is overall very well-appearing in clinic.          Return in about 2 weeks (around 9/12/2023) for If no better.    Leatha Hammond, [facility]    Shilpa Ramires is a 38 year old female who presents to  "clinic today for the following health issues:  Chief Complaint   Patient presents with    Chest Pain     Pt states started about 2 weeks right chest pain,dizzy,headache and BP elevated      HPI    Patient with right-sided dull chest pain. In the 2 two weeks, has had two episodes of shakiness, headache, \"eyes are dilated\", dizziness.  Someone else told her yesterday that she looked like she was going to pass house 2 days ago. Was feeling a bit dizzy yesterday, but not as bad as 2 days ago.      Will be better if eating/drinking sugary stuff, sx will resolve.      Had similar when pregnant 11 years ago.  Said she was told glucose was low during a similar episode 11 years ago.      Thinks her blood pressure was elevated, but it is normal.  Seems to be better with eating.    Stays at a shelter and they're not allowed to bring in outside food in her room.      2 days ago incident happened at 7:30pm.  Was getting a snack.  She ate before then at 5pm.  Was feeling weak before onset of sx above.      Has lost 10 lb.      Doesn't like food at shelter - usually doesn't eat all that is offered.      +Vaginal itching, urinary frequency.      Is still breast-feeding.        Review of Systems   Genitourinary:  Positive for frequency. Negative for dysuria.           Objective    /69 (BP Location: Right arm, Patient Position: Sitting, Cuff Size: Adult Regular)   Pulse 72   Temp 98.7  F (37.1  C) (Oral)   Resp 18   Wt 96.6 kg (213 lb)   LMP 08/14/2023   SpO2 98%   Breastfeeding Yes   BMI 35.45 kg/m    Physical Exam  Constitutional:       Appearance: Normal appearance.   Cardiovascular:      Rate and Rhythm: Normal rate and regular rhythm.      Pulses: Normal pulses.      Heart sounds: Normal heart sounds.   Pulmonary:      Effort: Pulmonary effort is normal.   Musculoskeletal:         General: Tenderness (Exquisite right-sided chest wall tenderness in the area of discomfort.) present.   Neurological:      Mental " Status: She is alert.   Psychiatric:         Mood and Affect: Mood normal.        Results for orders placed or performed in visit on 08/29/23   Glucose, whole blood     Status: Normal   Result Value Ref Range    Glucose Whole Blood 85 60 - 99 mg/dL   EKG 12-lead, tracing only     Status: None (Preliminary result)   Result Value Ref Range    Systolic Blood Pressure  mmHg    Diastolic Blood Pressure  mmHg    Ventricular Rate 72 BPM    Atrial Rate 72 BPM    MT Interval 152 ms    QRS Duration 76 ms     ms    QTc 438 ms    P Axis 54 degrees    R AXIS 2 degrees    T Axis 21 degrees    Interpretation ECG       Sinus rhythm  Normal ECG  No previous ECGs available     Wet prep     Status: Abnormal    Specimen: Vagina; Swab   Result Value Ref Range    Trichomonas Absent Absent    Yeast Absent Absent    Clue Cells Present (A) Absent    WBCs/high power field 1+ (A) None

## 2023-08-29 NOTE — LETTER
August 29, 2023      Keyla Arnold  [address] [place] [address]        To Whom It May Concern,   Keyla was seen here for symptoms of possible low blood sugar.  If possible, please allow her to keep snacks or glucose tablets in her room that she can use if her symptoms recur.  She has also been prescribed a blood glucose meter.  Please allow her to check her blood sugar if needed for symptoms.        Sincerely,        Leatha Hammond, CNP

## 2023-08-29 NOTE — PATIENT INSTRUCTIONS
You have bacterial vaginosis.  Please use the metronidazole gel nightly for 5 nights for the vaginal irritation.    I have provided you a meter and test strips to check your blood sugar.  Please follow instruction manual provided with the kit to have this done.  Call the customer service line if you are having difficulty following instructions.    Please do this if you are having symptoms of low blood sugar as you have described and record the numbers.  If the number is below 70, I would eat and then talk to your family practice provider on the phone about low blood sugar and what else to try.  Try to eat food provided even if you do not like it.    Your EKG is normal.    I think your chest pain is due to muscle and you can try warm wet towels to the area as well as Tylenol as needed.    Come back and be rechecked if the dizziness that you are experiencing does not resolve with eating.  Or go to emergency room.

## 2023-09-06 ENCOUNTER — TELEPHONE (OUTPATIENT)
Dept: NURSING | Facility: CLINIC | Age: 38
End: 2023-09-06
Payer: COMMERCIAL

## 2023-09-06 NOTE — TELEPHONE ENCOUNTER
Pt calling to see if her paperwork is ready to be picked up. Writer found this note in patient's chart and read it ot her. .      No further action needed by FNA.     Penny Crockett RN  [facility] Nurse Advisor 9:21 AM 9/6/2023

## 2023-09-08 ENCOUNTER — OFFICE VISIT (OUTPATIENT)
Dept: FAMILY MEDICINE | Facility: CLINIC | Age: 38
End: 2023-09-08
Payer: COMMERCIAL

## 2023-09-08 VITALS
OXYGEN SATURATION: 99 % | WEIGHT: 224.7 LBS | RESPIRATION RATE: 18 BRPM | BODY MASS INDEX: 37.39 KG/M2 | SYSTOLIC BLOOD PRESSURE: 119 MMHG | HEART RATE: 59 BPM | DIASTOLIC BLOOD PRESSURE: 79 MMHG | TEMPERATURE: 97.9 F

## 2023-09-08 DIAGNOSIS — R22.0 FACIAL SWELLING: ICD-10-CM

## 2023-09-08 DIAGNOSIS — T63.444A BEE STING REACTION, UNDETERMINED INTENT, INITIAL ENCOUNTER: Primary | ICD-10-CM

## 2023-09-08 PROCEDURE — 99213 OFFICE O/P EST LOW 20 MIN: CPT | Performed by: PHYSICIAN ASSISTANT

## 2023-09-08 RX ORDER — CETIRIZINE HYDROCHLORIDE 10 MG/1
10 TABLET ORAL DAILY
Qty: 30 TABLET | Refills: 11 | Status: SHIPPED | OUTPATIENT
Start: 2023-09-08 | End: 2024-04-08

## 2023-09-08 RX ORDER — PREDNISONE 20 MG/1
40 TABLET ORAL DAILY
Qty: 10 TABLET | Refills: 0 | Status: SHIPPED | OUTPATIENT
Start: 2023-09-08 | End: 2023-09-13

## 2023-09-08 NOTE — PROGRESS NOTES
Assessment & Plan     Bee sting reaction, undetermined intent, initial encounter   cetirizine (ZYRTEC) 10 MG tablet  Dispense: 30 tablet; Refill: 11  - predniSONE (DELTASONE) 20 MG tablet  Dispense: 10 tablet; Refill: 0  Is a local reaction to the insect bite.  We will have her do cool compresses and prednisone for the swelling as well as Zyrtec as ordered.    Facial swelling    - cetirizine (ZYRTEC) 10 MG tablet  Dispense: 30 tablet; Refill: 11  - predniSONE (DELTASONE) 20 MG tablet  Dispense: 10 tablet; Refill: 0       Patient was seen for facial pain and swelling after bee sting to the right temple.  Sting happened over 48 hours ago.  She has no red flags including shortness of breath difficulty breathing.  She has not taken any medications at home for this.  We will have her start Zyrtec as an antihistamine.  She was given short course of oral prednisone for facial swelling.  She may follow-up in an as-needed basis but was directed to the emergency room should she develop any shortness of breath difficulty breathing or concerns about significant throat swelling.  You are given and discussed for bee stings. At the end of the encounter, I discussed results, diagnosis, medications. Discussed red flags for immediate return to clinic/ER, as well as indications for follow up if no improvement. Patient understood and agreed to plan.       Hina Bhardwaj PA-C  [facility] JESSICA Ramires is a 38 year old female who presents to clinic today for the following health issues:  Chief Complaint   Patient presents with    Facial Swelling     Pt states started 2 days face swelling,red face,dizzy and tightness of face      HPI    Stung  by bee 2 days ago.  Last notight not feeling well.    Felt dizziness.    Tongue felt funny/different tingling sensation.  This morning felt more swollon and eye swelling.    She is not taking any home medications for this but has been doing cool compresses which  are somewhat helpful.        Review of Systems  Constitutional, HEENT, cardiovascular, pulmonary, gi and gu systems are negative, except as otherwise noted.      Objective    /79 (BP Location: Right arm, Patient Position: Sitting, Cuff Size: Adult Regular)   Pulse 59   Temp 97.9  F (36.6  C) (Oral)   Resp 18   Wt 101.9 kg (224 lb 11.2 oz)   LMP 09/08/2023   SpO2 99%   BMI 37.39 kg/m    Physical Exam   Pt is in no acute distress and appears well  Facial exam with mild R temporal swelling, slightly edematous R upper lid, not obstructing vision, mild erythema R face.    Oral airway is patent. Tongue non edematous.  Lungs CTA  Heart RRR

## 2023-09-14 ENCOUNTER — NURSE TRIAGE (OUTPATIENT)
Dept: NURSING | Facility: CLINIC | Age: 38
End: 2023-09-14
Payer: COMMERCIAL

## 2023-09-14 NOTE — TELEPHONE ENCOUNTER
Nurse Triage SBAR    Is this a 2nd Level Triage? No    Situation/Background: Patient is calling that she has a new large bruise on her left thigh that developed sometime today. Patient did not see the bruise on her thigh when she awoke this morning.     Assessment:   Patient states the bruise is one lark very dark purple, larger than her hand - has individual dots  Patient does not recall injuring herself or bumping into anything  Feeling drained for a few days  Patient denies having a fever. Her friend stated she feels warm to touch, patient does not have a thermometer available  Face was very red yesterday, is still pink today  Patient reports dizziness the last few weeks. Feels like the room is spinning when she closes her eyes.   Has been testing blood sugar but has been normal.     Recommendation: Per disposition, Go to ED now. Patient advised that if she has a feverPatient stated she would go to ED and knew where she would go. Advised patient to call back with any new or worsening symptoms. Patient verbalized understanding and agrees with plan.    Protocol Recommended Disposition: Emergency department    Alicia Clay RN on 9/14/2023 at 1:35 PM  [facility] Nurse Advisors  Reason for Disposition   Dizziness or lightheadedness    Additional Information   Negative: Shock suspected (e.g., cold/pale/clammy skin, too weak to stand, low BP, rapid pulse)   Negative: Fever and purple or blood-colored spots or dots   Negative: Sounds like a life-threatening emergency to the triager   Negative: Bruise(s) of forehead or head   Negative: Bruise(s) of face or jaw   Negative: Patient has a concerning injury (e.g., chest, neck, leg)   Negative: Post-operative bruising   Negative: Unexplained bleeding from another site (e.g., gums, nose, urine) as well   Negative: Patient sounds very sick or weak to the triager    Protocols used: Bruises-A-OH

## 2023-09-26 ENCOUNTER — OFFICE VISIT (OUTPATIENT)
Dept: FAMILY MEDICINE | Facility: CLINIC | Age: 38
End: 2023-09-26
Payer: COMMERCIAL

## 2023-09-26 ENCOUNTER — HOSPITAL ENCOUNTER (OUTPATIENT)
Dept: GENERAL RADIOLOGY | Facility: HOSPITAL | Age: 38
Discharge: HOME | End: 2023-09-26
Attending: FAMILY MEDICINE | Admitting: FAMILY MEDICINE
Payer: COMMERCIAL

## 2023-09-26 VITALS
HEART RATE: 58 BPM | WEIGHT: 224 LBS | SYSTOLIC BLOOD PRESSURE: 111 MMHG | DIASTOLIC BLOOD PRESSURE: 56 MMHG | TEMPERATURE: 98.4 F | BODY MASS INDEX: 37.32 KG/M2 | HEIGHT: 65 IN | OXYGEN SATURATION: 97 % | RESPIRATION RATE: 16 BRPM

## 2023-09-26 DIAGNOSIS — M54.42 CHRONIC BILATERAL LOW BACK PAIN WITH BILATERAL SCIATICA: ICD-10-CM

## 2023-09-26 DIAGNOSIS — M54.42 CHRONIC BILATERAL LOW BACK PAIN WITH BILATERAL SCIATICA: Primary | ICD-10-CM

## 2023-09-26 DIAGNOSIS — F39 EPISODIC MOOD DISORDER (H): ICD-10-CM

## 2023-09-26 DIAGNOSIS — G89.29 CHRONIC BILATERAL LOW BACK PAIN WITH BILATERAL SCIATICA: ICD-10-CM

## 2023-09-26 DIAGNOSIS — G89.29 CHRONIC BILATERAL LOW BACK PAIN WITH BILATERAL SCIATICA: Primary | ICD-10-CM

## 2023-09-26 DIAGNOSIS — Z86.39 HISTORY OF HYPOGLYCEMIA: ICD-10-CM

## 2023-09-26 DIAGNOSIS — J45.20 MILD INTERMITTENT ASTHMA WITHOUT COMPLICATION: ICD-10-CM

## 2023-09-26 DIAGNOSIS — M54.41 CHRONIC BILATERAL LOW BACK PAIN WITH BILATERAL SCIATICA: Primary | ICD-10-CM

## 2023-09-26 DIAGNOSIS — M25.512 ACUTE PAIN OF LEFT SHOULDER: ICD-10-CM

## 2023-09-26 DIAGNOSIS — M54.41 CHRONIC BILATERAL LOW BACK PAIN WITH BILATERAL SCIATICA: ICD-10-CM

## 2023-09-26 LAB — HBA1C MFR BLD: 5.5 % (ref 0–5.6)

## 2023-09-26 PROCEDURE — 36415 COLL VENOUS BLD VENIPUNCTURE: CPT | Performed by: FAMILY MEDICINE

## 2023-09-26 PROCEDURE — 90686 IIV4 VACC NO PRSV 0.5 ML IM: CPT | Performed by: FAMILY MEDICINE

## 2023-09-26 PROCEDURE — 99214 OFFICE O/P EST MOD 30 MIN: CPT | Mod: 25 | Performed by: FAMILY MEDICINE

## 2023-09-26 PROCEDURE — 90471 IMMUNIZATION ADMIN: CPT | Performed by: FAMILY MEDICINE

## 2023-09-26 PROCEDURE — 83036 HEMOGLOBIN GLYCOSYLATED A1C: CPT | Performed by: FAMILY MEDICINE

## 2023-09-26 PROCEDURE — 72100 X-RAY EXAM L-S SPINE 2/3 VWS: CPT

## 2023-09-26 RX ORDER — ALBUTEROL SULFATE 0.83 MG/ML
2.5 SOLUTION RESPIRATORY (INHALATION) 4 TIMES DAILY
Qty: 90 ML | Refills: 0 | Status: SHIPPED | OUTPATIENT
Start: 2023-09-26

## 2023-09-26 RX ORDER — GABAPENTIN 300 MG/1
300 CAPSULE ORAL 3 TIMES DAILY
Qty: 90 CAPSULE | Refills: 1 | Status: SHIPPED | OUTPATIENT
Start: 2023-09-26 | End: 2024-04-08

## 2023-09-26 RX ORDER — ALBUTEROL SULFATE 90 UG/1
1-2 AEROSOL, METERED RESPIRATORY (INHALATION) EVERY 4 HOURS PRN
Qty: 17 G | Refills: 1 | Status: SHIPPED | OUTPATIENT
Start: 2023-09-26

## 2023-09-26 ASSESSMENT — ASTHMA QUESTIONNAIRES: ACT_TOTALSCORE: 10

## 2023-09-26 ASSESSMENT — ANXIETY QUESTIONNAIRES
2. NOT BEING ABLE TO STOP OR CONTROL WORRYING: MORE THAN HALF THE DAYS
5. BEING SO RESTLESS THAT IT IS HARD TO SIT STILL: MORE THAN HALF THE DAYS
1. FEELING NERVOUS, ANXIOUS, OR ON EDGE: MORE THAN HALF THE DAYS
GAD7 TOTAL SCORE: 15
GAD7 TOTAL SCORE: 15
IF YOU CHECKED OFF ANY PROBLEMS ON THIS QUESTIONNAIRE, HOW DIFFICULT HAVE THESE PROBLEMS MADE IT FOR YOU TO DO YOUR WORK, TAKE CARE OF THINGS AT HOME, OR GET ALONG WITH OTHER PEOPLE: VERY DIFFICULT
6. BECOMING EASILY ANNOYED OR IRRITABLE: NEARLY EVERY DAY
3. WORRYING TOO MUCH ABOUT DIFFERENT THINGS: MORE THAN HALF THE DAYS
7. FEELING AFRAID AS IF SOMETHING AWFUL MIGHT HAPPEN: MORE THAN HALF THE DAYS
4. TROUBLE RELAXING: MORE THAN HALF THE DAYS

## 2023-09-26 ASSESSMENT — PAIN SCALES - GENERAL: PAINLEVEL: SEVERE PAIN (7)

## 2023-09-26 NOTE — PROGRESS NOTES
"  Assessment & Plan     Chronic bilateral low back pain with bilateral sciatica  Chronic, likely SI joint dysfunction, given location. She does have predominantly neuropathic pain, will recommend gabapentin to suppress nerve pain. Will overall need physical therapy follow up, but difficult to arrange due to social situation.   - XR Lumbar Spine 2/3 Views  - gabapentin (NEURONTIN) 300 MG capsule  Dispense: 90 capsule; Refill: 1    Mild intermittent asthma without complication  Chronic, not well controlled. Patient up to date on vaccines. Recommend she take her daily steroid. If still symptomatic, increase to ICS-LABA of higher potency.   - albuterol (PROAIR HFA) 108 (90 Base) MCG/ACT inhaler  Dispense: 17 g; Refill: 1  - fluticasone (FLOVENT DISKUS) 50 MCG/ACT inhaler  Dispense: 60 each; Refill: 3    History of hypoglycemia  Acute, patient symptomatic. Reporting normal blood sugars, concern for possible relative hypoglycemia (if diabetic) so will check A1c.   - Hemoglobin A1c  - Hemoglobin A1c    Acute pain of left shoulder  Acute, Likely tendon injury from carry injury. Recommend orthopedic.   - Orthopedic  Referral    Episodic mood disorder (H)  Chronic, not stable. Multiple diagnoses including bipolar, ADHD, but also low functioning and extraneous social situations. Patient interested in the [facility], agree as she will need more specialized care.       Ordering of each unique test  Prescription drug management  I spent a total of 32 minutes on the day of the visit.   Time spent by me doing chart review, history and exam, documentation and further activities per the note       BMI:   Estimated body mass index is 37.28 kg/m  as calculated from the following:    Height as of this encounter: 1.651 m (5' 5\").    Weight as of this encounter: 101.6 kg (224 lb).   Weight management plan: Discussed healthy diet and exercise guidelines    See Patient Instructions    EASTON HENRIQUEZ DO  [facility] " [facility] JESSICA Ramires is a 38 year old, presenting for the following health issues:  Follow Up and Back Pain (Pt states that it starts in her lower-middle back, and spreads to her hips and down her left leg. )        9/26/2023     2:21 PM   Additional Questions   Roomed by Virgilio ALCANTARA   Accompanied by Son       History of Present Illness     Asthma:  She presents for follow up of asthma.  She has some cough, some wheezing, and some shortness of breath.  She is using a relief medication a few times a week. She typically misses taking her controller medication 4 time(s) per week. Patient is aware of the following triggers: same as previous visit. The patient has not had a visit to the Emergency Room, Urgent Care or Hospital due to asthma since the last clinic visit.     Back Pain:  She presents for follow up of back pain. Patient's back pain is a new problem.    Original cause of back pain: not sure  First noticed back pain: in the last week  Patient feels back pain: constantlyLocation of back pain:  Right middle of back and left shoulder  Description of back pain: sharp  Back pain spreads: nowhere    Since patient first noticed back pain, pain is: gradually worsening  Does back pain interfere with her job:  Yes  On a scale of 1-10 (10 being the worst), patient describes pain as:  7  What makes back pain worse: certain positions, standing and other   Acupuncture: not tried  Acetaminophen: not tried  Activity or exercise: not helpful  Chiropractor:  Not tried  Cold: not helpful  Heat: not helpful  Massage: not helpful  Muscle relaxants: not helpful  NSAIDS: not helpful  Opioids: not tried  Physical Therapy: not helpful  Rest: not helpful  Steroid Injection: not tried  Stretching: not helpful  Surgery: not tried  TENS unit: not tried  Topical pain relievers: not tried  Other healthcare providers patient is seeing for back pain: None    Mental Health Follow-up:  Patient presents to follow-up on  "Depression & Anxiety.Patient's depression since last visit has been:  Medium  The patient is having other symptoms associated with depression.  Patient's anxiety since last visit has been:  Worse  The patient is having other symptoms associated with anxiety.  Any significant life events: financial concerns and housing concerns  Patient is feeling anxious or having panic attacks.  Patient has no concerns about alcohol or drug use.    Headaches:   Since the patient's last clinic visit, headaches are: worsened  The patient is getting headaches:  Weekly  She is not able to do normal daily activities when she has a migraine.  The patient is taking the following rescue/relief medications:  Other   Patient states \"I get no relief\" from the rescue/relief medications.   The patient is taking the following medications to prevent migraines:  No medications to prevent migraines  In the past 4 weeks, the patient has gone to an Urgent Care or Emergency Room 0 times times due to headaches.    Reason for visit:  Qwit moking pain in left sholder    She eats 0-1 servings of fruits and vegetables daily.She consumes 3 sweetened beverage(s) daily.She exercises with enough effort to increase her heart rate 20 to 29 minutes per day.  She exercises with enough effort to increase her heart rate 7 days per week.   She is taking medications regularly.     Left arm has a shooting pain and her back pain radiating down the leg. Standing, walking aggravates the pain. It's been worsened with recent changes. Sometimes laying down will help.     Patient has had really low blood sugars. She reports symptoms of hypoglycemia, having issues with pop and meal after eating. She reports issues with her blood sugar, ranging from having 80s-290s.     Review of Systems   Constitutional, HEENT, cardiovascular, pulmonary, gi and gu systems are negative, except as otherwise noted.      Objective    /56 (BP Location: Right arm, Patient Position: Sitting, " "Cuff Size: Adult Large)   Pulse 58   Temp 98.4  F (36.9  C) (Oral)   Resp 16   Ht 1.651 m (5' 5\")   Wt 101.6 kg (224 lb)   LMP 09/08/2023 (Exact Date)   SpO2 97%   BMI 37.28 kg/m    Body mass index is 37.28 kg/m .  Physical Exam   GENERAL: healthy, alert and no distress  EYES: Eyes grossly normal to inspection, PERRL and conjunctivae and sclerae normal  NECK: no adenopathy, no asymmetry, masses, or scars and thyroid normal to palpation  MS: no gross musculoskeletal defects noted, no edema  PSYCH: mentation appears normal, concentration poor, tearful, anxious, and speech pressured    Results for orders placed or performed in visit on 09/26/23 (from the past 24 hour(s))   Hemoglobin A1c   Result Value Ref Range    Hemoglobin A1C 5.5 0.0 - 5.6 %                 "

## 2023-09-26 NOTE — PATIENT INSTRUCTIONS
Good job with the flu shot!!    Follow up in 1-2 months to see how the back pain and shoulder pain is going.

## 2023-09-26 NOTE — COMMUNITY RESOURCES LIST (ENGLISH)
09/26/2023   [facility]  N/A  For questions about this resource list or additional care needs, please contact your primary care clinic or care manager.  Phone: [phone]   Email: N/A   Address: [address] [place] [address]   Hours: N/A        Financial Stability       Rent and mortgage payment assistance  1  [facility] [place] Distance: 0.5 miles      Phone/Virtual   [address] [place] [address]  Language: English  Hours: Mon - Fri 9:00 AM - 4:00 PM  Fees: Free   Phone: (164) 686-4841 Email: evette@[facility] IrvingOncopeptides.[employer] Website: http://[facility] CenterTivity.org/[facility]/     2  [facility] ([facility]) [facility] Supportive Housing Assistance Program (SHAP) Distance: 0.71 miles      Phone/Virtual   [address] [place] [address]  Language: English, Hmong, Halle, Surinamese  Hours: Mon - Fri 8:30 AM - 5:00 PM  Fees: Free   Phone: (580) 290-8146 Email: trent@[facility].org Website: http://www.ong.org/[facility]-impact-areas/          Food and Nutrition       Food pantry  3  [facility] Distance: 0.5 miles      [facility]   [address] [place] [address]  Language: English  Hours: Mon - Tue 9:00 AM - 3:00 PM  Fees: Free, Self Pay   Phone: (386) 177-8778 Email: evette@[facility] – TulsaMajitekBaylor Scott & White Medical Center – IrvingOncopeptides.[employer] Systems Website: http://[facility] Center – IrvingYouChe.com.org/[facility]/     4  [facility] [employer] Distance: 0.72 miles      Pickup   [address] [place] [address]  Language: English, Swiss  Hours: Sat 9:30 AM - 11:30 AM  Fees: Free   Phone: (156) 164-6099 Website: http://www.[facility].org     SNAP application assistance  5  [facility] Distance: 0.5 miles      Phone/Virtual   [address] [place] [address]  Language: English  Hours: Mon - Thu 9:00 AM - 4:00 PM , Fri 9:00 AM - 2:00 PM , Sun 10:00  AM - 12:00 PM  Fees: Free   Phone: (689) 848-9405 Email: evette@[facility] YukonConnectedHealthMethodist Hospital NortheastKeVita.[facility] Website: http://[facility] NortheastExco inTouch.DriverTech/[facility]/yw-klty-cnkyg-ave/     6  [place] Department of Human Services - MNFoodHelper (SNAP) Distance: 1.74 miles      Phone/Virtual   [address] [address] [place] [address]  Language: English, Hmong, Bangladeshi, Ecuadorean, [place] citizen, Belarusian  Hours: Mon - Fri 9:00 AM - 5:00 PM  Fees: Free   Phone: (601) 428-1791 Website: https://[place].gov/dhs/people-we-serve/adults/economic-assistance/food-nutrition/programs-and-services/supplemental-nutrition-assistance-program.jsp     Soup kitchen or free meals  7  [facility] Distance: 0.5 miles      Pickup   [address] [place] [address]  Language: English  Hours: Mon - Fri 11:45 AM - 12:45 PM  Fees: Free   Phone: (298) 873-9985 Email: evette@[facility] NortheastXanofi.[facility] Website: http://[facility] NortheastExco inTouch.DriverTech/[facility]/tr-ogmx-yipkb-ave/     8  [facility] Distance: 2.06 miles      Pickup   [address] [place] [address]  Language: English  Hours: Wed 5:30 PM - 6:30 PM  Fees: Free   Phone: (797) 609-1055 Email: office@orMer[facility] hospital springfield.org Website: https://www.ScirraandfishGen[facility] General Hospital.org          Hotlines and Helplines       Hotline - Housing crisis  9  [employer] Distance: 9.01 miles      Phone/Virtual   [address] [place] [address]  Language: English  Hours: Mon - Sun Open 24 Hours   Phone: (524) 518-8908 Email: communications@[facility].org Website: https://[facility]-mn.org/oursaviourshousing/     10  [facility] Distance: 10.61 miles      Phone/Virtual   [address] [place] [address]  Language: English  Hours: Mon - Sun Open 24 Hours   Phone: (141) 525-1461 Email: info@PagaT[employer].DriverTech Website: http://www."[employer]."[facility].org          Housing       Coordinated Entry access point  11  [facility] Distance: 2.38 miles      In-Person, Phone/Virtual   [address] [place] [address]  Language: English, Gabonese  Hours: Mon - Fri 8:30 AM - 4:30 PM  Fees: Free   Phone: (758) 917-6232 Email: info@Oscar.[employer] Website: https://www.[facility].org/locations/downHospital of the [facility]-clinic/     12  [facility] - Coordinated Access to Housing and Shelter (CAHS) - Coordinated Access Distance: 3.94 miles      In-Person, Phone/Virtual   [address] [place] [address]  Language: English  Hours: Mon - Fri 8:00 AM - 4:30 PM  Fees: Free   Phone: (678) 620-3708 Website: https://www.[facility]./residents/assistance-support/assistance/housing-services-support     Drop-in center or day shelter  13  [facility] Distance: 1.31 miles      In-Person   [address] [place] [address]  Language: English  Hours: Mon - Fri 9:00 AM - 4:00 PM  Fees: Free   Phone: (351) 604-2149 Email: carmina@[employer] Website: http://[employer] Sports     14  [facility] [place] Shelter Distance: 2.39 miles      In-Person   [address] [place] [address]  Language: English  Hours: Mon - Sun 9:00 AM - 5:30 PM  Fees: Free, Self Pay   Phone: (274) 621-4646 Email: info@GainSpan.[employer] Website: https://www.[facility] SurgeryUYA100.org/locations/[facility]-[place]/     Housing search assistance  15  [facility] - Housing Search Assistance Distance: 3.24 miles      Phone/Virtual   [address] [place] [address]  Language: Telugu, English, Hmong, Halle, Japanese, Gabonese  Hours: Mon - Fri Appt. Only  Fees: Free   Phone: (568) 900-9551 Website: https://Manzuo.com.org/     16  [facility] [place] - Mental Health Crisis Housing Search Assistance Distance: 5.13 miles      In-Person, Phone/Virtual   [address] [place]  [address]  Language: English  Hours: Mon - Tue 8:00 AM - 4:30 PM , Wed 8:00 AM - 6:00 PM , Thu - Fri 8:00 AM - 4:30 PM  Fees: Free   Phone: (600) 348-1361 Email: [facility] Website: https://www.[facility]/residents/health-medical/clinics-services/mental-health/adult-mental-health     Shelter for families  17  [facility] Distance: 13.13 miles      In-Person   [address] [place] [address]  Language: English  Hours: Mon - Fri 3:00 PM - 9:00 AM , Sat - Sun Open 24 Hours  Fees: Free   Phone: (215) 143-9722 Ext.1 Website: https://www.saintandrews.[employer]rapies/2020/07/03/emergency-family-shelter/     Shelter for individuals  18  [facility] [place] Shelter Distance: 2.39 miles      In-Person   [address] [place] [address]  Language: English  Hours: Mon - Sun 5:00 PM - 10:00 AM  Fees: Free, Self Pay   Phone: (780) 402-7084 Email: info@H[employer]Wheel.[employer] Website: https://www.[facility] SaultSonicbids.org/[facility]-saint-[place]/     19  [facility] Distance: 9.01 miles      In-Person   [address] [place] [address]  Language: English  Hours: Mon - Sun Open 24 Hours  Fees: Free   Phone: (509) 415-9366 Email: communications@oscs-mn.org Website: https://oscs-mn.org/oursaviourshousing/          Transportation       Free or low-cost transportation  20  [employer] Distance: 2.18 miles      In-Person   [address] [place] [address]  Language: English, Norwegian  Hours: Mon - Sun Open 24 Hours  Fees: Self Pay   Phone: (354) 946-3991 Ext.2 Website: http://www.metThe Totus GroupranMarine Drive Mobilet.org     21  [facility] Circulator Bus Distance: 5.63 miles      In-Person   [address] [place] [address]  Language: English  Hours: Tue 9:00 AM - 2:00 PM  Fees: Self Pay   Phone: (657) 759-2889 Email: info@AdXpose.[employer] Website: http://www.CallYourPricenects.org/     Transportation to [facility]  appointments  22  [employer] Ride Distance: 2.68 miles      In-Person   [address] [facility] [address] [place] [address]  Language: English  Hours: Mon - Thu 6:00 AM - 6:00 PM , Fri 6:00 AM - 5:00 PM  Fees: Insurance, Self Pay   Phone: (116) 596-5676 Email: office@[employer] Website: https://www.[employer]/     23  [facility] - Non-Emergency Medical Transportation Distance: 2.85 miles      In-Person   [address] [place] [address]  Language: English  Hours: Mon - Fri 8:00 AM - 4:00 PM Appt. Only  Fees: Self Pay   Phone: (143) 130-4646 Website: http://www.[employer].org/Medical-Services/Emergency-medical-services/Non-emergency-transportation/          Important Numbers & Websites       Emergency Services   911  [facility]   311  Poison Control   (698) 133-7841  Suicide Prevention Lifeline   (446) 334-9516 (TALK)  Child Abuse Hotline   (876) 742-3080 (4-A-Child)  Sexual Assault Hotline   (137) 946-3169 (HOPE)  National Runaway Safeline   (664) 472-6338 (RUNAWAY)  All-Options Talkline   (985) 295-2725  Substance Abuse Referral   (256) 863-1492 (HELP)

## 2023-11-20 ENCOUNTER — OFFICE VISIT (OUTPATIENT)
Dept: URGENT CARE | Facility: URGENT CARE | Age: 38
End: 2023-11-20
Payer: COMMERCIAL

## 2023-11-20 VITALS
BODY MASS INDEX: 35.16 KG/M2 | SYSTOLIC BLOOD PRESSURE: 125 MMHG | HEIGHT: 67 IN | TEMPERATURE: 98.2 F | DIASTOLIC BLOOD PRESSURE: 61 MMHG | HEART RATE: 64 BPM | WEIGHT: 224 LBS | OXYGEN SATURATION: 97 %

## 2023-11-20 DIAGNOSIS — K04.7 TOOTH ABSCESS: Primary | ICD-10-CM

## 2023-11-20 PROCEDURE — 99213 OFFICE O/P EST LOW 20 MIN: CPT | Performed by: PHYSICIAN ASSISTANT

## 2023-11-20 RX ORDER — CELECOXIB 200 MG/1
200 CAPSULE ORAL 2 TIMES DAILY
Qty: 20 CAPSULE | Refills: 0 | Status: SHIPPED | OUTPATIENT
Start: 2023-11-20 | End: 2024-04-08

## 2023-11-20 RX ORDER — LIDOCAINE HYDROCHLORIDE 20 MG/ML
15 SOLUTION OROPHARYNGEAL
Qty: 200 ML | Refills: 0 | Status: SHIPPED | OUTPATIENT
Start: 2023-11-20 | End: 2024-04-08

## 2023-11-20 RX ORDER — IBUPROFEN 200 MG
200 TABLET ORAL EVERY 4 HOURS PRN
COMMUNITY
End: 2024-04-08

## 2023-11-20 RX ORDER — CLINDAMYCIN HCL 300 MG
300 CAPSULE ORAL 4 TIMES DAILY
Qty: 40 CAPSULE | Refills: 0 | Status: SHIPPED | OUTPATIENT
Start: 2023-11-20 | End: 2023-11-30

## 2023-11-20 NOTE — PROGRESS NOTES
Tooth abscess  - lidocaine, viscous, (XYLOCAINE) 2 % solution; Swish and spit 15 mLs in mouth every 3 hours as needed for pain ; Max 8 doses/24 hour period.  - clindamycin (CLEOCIN) 300 MG capsule; Take 1 capsule (300 mg) by mouth 4 times daily for 10 days  - celecoxib (CELEBREX) 200 MG capsule; Take 1 capsule (200 mg) by mouth 2 times daily for 10 days    Apply ice for 15-20 minutes intermittently as needed.     Okay to take acetaminophen 500 mg- 2 tabs (Total of 1000 mg) every 8 hrs        Abscessed Tooth: Care Instructions  Overview     An abscessed tooth is a tooth that has a pocket of pus in the tissues around it. Pus forms when the body tries to fight an infection caused by bacteria. If the pus cannot drain, it forms an abscess. An abscessed tooth can cause red, swollen gums and throbbing pain, especially when you chew. You may have a bad taste in your mouth and a fever, and your jaw may swell.  Damage to the tooth, untreated tooth decay, or gum disease can cause an abscessed tooth.  An abscessed tooth needs to be treated by a dental professional right away. If it is not treated, the infection could spread to other parts of your body. Your dentist will give you antibiotics to stop the infection. If antibiotics don't stop the infection, you may need other treatments.  Follow-up care is a key part of your treatment and safety. Be sure to make and go to all appointments, and call your doctor if you are having problems. It's also a good idea to know your test results and keep a list of the medicines you take.  How can you care for yourself at home?  Brush and floss gently.  Reduce pain and swelling in your face and jaw by putting ice or a cold pack on the outside of your cheek. Do this for 10 to 20 minutes at a time. Put a thin cloth between the ice and your skin.  Avoid using tobacco products. Tobacco and nicotine slow your ability to heal.  Take pain medicines exactly as directed.  If the doctor gave you a  "prescription medicine for pain, take it as prescribed.  If you are not taking a prescription pain medicine, ask your doctor if you can take an over-the-counter medicine such as acetaminophen (Tylenol) or ibuprofen (Advil or Motrin). Be safe with medicines. Read and follow all instructions on the label.  Take antibiotics as directed. Do not stop taking them just because you feel better. You need to take the full course of antibiotics.  When should you call for help?   Call 911 anytime you think you may need emergency care. For example, call if:    You have trouble breathing.   Call your doctor now or seek immediate medical care if:    You have new or worse symptoms of infection, such as:  Increased pain, swelling, warmth, or redness.  Red streaks leading from the area.  Pus draining from the area.  A fever.   Watch closely for changes in your health, and be sure to contact your doctor if:    You do not get better as expected.   Where can you learn more?  Go to https://www.Filecoin.net/patiented  Enter L466 in the search box to learn more about \"Abscessed Tooth: Care Instructions.\"  Current as of: November 13, 2022               Content Version: 13.8    8723-8807 [employer].   Care instructions adapted under license by your healthcare professional. If you have questions about a medical condition or this instruction, always ask your healthcare professional. [employer] disclaims any warranty or liability for your use of this information.               [facility] URGENT CARE    Subjective   38 year old who presents to clinic today for the following health issues:    Urgent Care, Dental Pain, and Facial Swelling       HPI     Patient visits today for dental pain and face swelling on the upper right side for the past month and it has been getting worse this past week. Patient states that she has a cavity on right upper molar that had a filling that fell out. " Patient has been icing the area on a regular basis. She has been using ibuprofen and Tylenol. She states that she is unable to chew as it makes her pain worse.    She denies fever, chills, or body aches but she has been having fatigue.     Review of Systems   Review of Systems   See HPI    Objective    Temp: 98.2  F (36.8  C) Temp src: Temporal BP: 125/61 Pulse: 64     SpO2: 97 %       Physical Exam   Physical Exam  Constitutional:       General: She is not in acute distress.     Appearance: Normal appearance. She is normal weight. She is not ill-appearing, toxic-appearing or diaphoretic.   HENT:      Head: Normocephalic and atraumatic.      Mouth/Throat:      Dentition: Dental tenderness, gingival swelling, dental caries and dental abscesses present.     Cardiovascular:      Rate and Rhythm: Normal rate.      Pulses: Normal pulses.   Pulmonary:      Effort: Pulmonary effort is normal. No respiratory distress.   Neurological:      General: No focal deficit present.      Mental Status: She is alert and oriented to person, place, and time. Mental status is at baseline.      Gait: Gait normal.   Psychiatric:         Mood and Affect: Mood normal.         Behavior: Behavior normal.         Thought Content: Thought content normal.         Judgment: Judgment normal.          No results found for this or any previous visit (from the past 24 hour(s)).

## 2023-11-20 NOTE — LETTER
November 20, 2023      Keyla Arnold  [address] [place] [address]        To Whom It May Concern:    Keyla Arnold  was seen on 11/20. Patient was prescribed several medications and will need at least 8 oz of juice or milk to swallow them.         Sincerely,        Marty Booth PA-C

## 2024-02-17 ENCOUNTER — NURSE TRIAGE (OUTPATIENT)
Dept: NURSING | Facility: CLINIC | Age: 39
End: 2024-02-17
Payer: COMMERCIAL

## 2024-02-17 NOTE — TELEPHONE ENCOUNTER
"Over last couple weeks has been \"uncomfortable \" in vaginal area.  Vaginal itching which stopped with menses but has now resumed. Pain with urination and frequency x 2 days, also incontinent of urine. R sided abdominal pain, is a \"dull\" pain. This is typical of UTI's. Abdominal pain is at level of umbilicus. Pain with urination is \"7\". Afebrile. White vaginal discharge, Odor not present. Does have a lot of problems with BV. .     Protocol reviewed, DISPOSITION: See PCP within 24 hours. Patient says she cannot arrange transport until Monday. This writer reviewed the disposition is to be seen within 24 hours. Call back with worsening symptoms, further questions/concerns. Transferred to scheduling.     JONATHAN MOLINA RN  [facility] nurse advisors  2/17/2024  2:33 PM  Reason for Disposition   Urinating more frequently than usual (i.e., frequency)    Additional Information   Negative: Shock suspected (e.g., cold/pale/clammy skin, too weak to stand, low BP, rapid pulse)   Negative: Sounds like a life-threatening emergency to the triager   Negative: Followed a female genital area injury (e.g., labia, vagina, vulva)   Negative: Followed a male genital area injury (e.g., penis, scrotum)   Negative: Vaginal discharge   Negative: Pus (white, yellow) or bloody discharge from end of penis   Negative: [1] Taking antibiotic for urinary tract infection (UTI) AND [2] female   Negative: [1] Taking antibiotic for urinary tract infection (UTI) AND [2] male   Negative: [1] Pain or burning with passing urine (urination) AND [2] pregnant   Negative: [1] Pain or burning with passing urine (urination) AND [2] postpartum (from 0 to 6 weeks after delivery)   Negative: [1] Pain or burning with passing urine (urination) AND [2] female   Negative: [1] Pain or burning with passing urine (urination) AND [2] male   Negative: Pain or itching in the vulvar area   Negative: Pain in scrotum is main symptom   Negative: Blood in the urine is main " symptom   Negative: Symptoms arising from use of a urinary catheter (e.g., Coude, Fernando)   Negative: [1] Unable to urinate (or only a few drops) > 4 hours AND [2] bladder feels very full (e.g., palpable bladder or strong urge to urinate)   Negative: [1] Decreased urination and [2] drinking very little AND [2] dehydration suspected (e.g., dark urine, no urine > 12 hours, very dry mouth, very lightheaded)   Negative: Patient sounds very sick or weak to the triager   Negative: Fever > 100.4 F (38.0 C)   Negative: Side (flank) or lower back pain present    Protocols used: Urinary Symptoms-A-AH

## 2024-03-31 ENCOUNTER — NURSE TRIAGE (OUTPATIENT)
Dept: NURSING | Facility: CLINIC | Age: 39
End: 2024-03-31
Payer: COMMERCIAL
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
## 2024-04-08 ENCOUNTER — OFFICE VISIT (OUTPATIENT)
Dept: FAMILY MEDICINE | Facility: CLINIC | Age: 39
End: 2024-04-08
Payer: COMMERCIAL

## 2024-04-08 VITALS
BODY MASS INDEX: 36.15 KG/M2 | DIASTOLIC BLOOD PRESSURE: 70 MMHG | WEIGHT: 230.3 LBS | TEMPERATURE: 98.4 F | HEIGHT: 67 IN | SYSTOLIC BLOOD PRESSURE: 100 MMHG | HEART RATE: 77 BPM | OXYGEN SATURATION: 98 % | RESPIRATION RATE: 18 BRPM

## 2024-04-08 DIAGNOSIS — Z12.4 CERVICAL CANCER SCREENING: ICD-10-CM

## 2024-04-08 DIAGNOSIS — R30.0 DYSURIA: Primary | ICD-10-CM

## 2024-04-08 DIAGNOSIS — N89.8 VAGINAL DISCHARGE: ICD-10-CM

## 2024-04-08 DIAGNOSIS — F39 EPISODIC MOOD DISORDER (H): ICD-10-CM

## 2024-04-08 LAB
ALBUMIN UR-MCNC: NEGATIVE MG/DL
APPEARANCE UR: ABNORMAL
BACTERIA #/AREA URNS HPF: ABNORMAL /HPF
BILIRUB UR QL STRIP: NEGATIVE
CLUE CELLS: ABNORMAL
COLOR UR AUTO: YELLOW
GLUCOSE UR STRIP-MCNC: NEGATIVE MG/DL
HGB UR QL STRIP: ABNORMAL
KETONES UR STRIP-MCNC: NEGATIVE MG/DL
LEUKOCYTE ESTERASE UR QL STRIP: ABNORMAL
NITRATE UR QL: NEGATIVE
PH UR STRIP: 6 [PH] (ref 5–8)
RBC #/AREA URNS AUTO: ABNORMAL /HPF
SP GR UR STRIP: 1.02 (ref 1–1.03)
SQUAMOUS #/AREA URNS AUTO: ABNORMAL /LPF
TRICHOMONAS, WET PREP: ABNORMAL
UROBILINOGEN UR STRIP-ACNC: 0.2 E.U./DL
WBC #/AREA URNS AUTO: ABNORMAL /HPF
WBC CLUMPS #/AREA URNS HPF: ABNORMAL /HPF
WBC'S/HIGH POWER FIELD, WET PREP: ABNORMAL
YEAST, WET PREP: ABNORMAL

## 2024-04-08 PROCEDURE — 87491 CHLMYD TRACH DNA AMP PROBE: CPT | Performed by: FAMILY MEDICINE

## 2024-04-08 PROCEDURE — 81001 URINALYSIS AUTO W/SCOPE: CPT | Performed by: FAMILY MEDICINE

## 2024-04-08 PROCEDURE — 99214 OFFICE O/P EST MOD 30 MIN: CPT | Performed by: FAMILY MEDICINE

## 2024-04-08 PROCEDURE — 87210 SMEAR WET MOUNT SALINE/INK: CPT | Performed by: FAMILY MEDICINE

## 2024-04-08 PROCEDURE — 87086 URINE CULTURE/COLONY COUNT: CPT | Performed by: FAMILY MEDICINE

## 2024-04-08 PROCEDURE — 87624 HPV HI-RISK TYP POOLED RSLT: CPT | Performed by: FAMILY MEDICINE

## 2024-04-08 PROCEDURE — 87591 N.GONORRHOEAE DNA AMP PROB: CPT | Performed by: FAMILY MEDICINE

## 2024-04-08 PROCEDURE — G0145 SCR C/V CYTO,THINLAYER,RESCR: HCPCS | Performed by: FAMILY MEDICINE

## 2024-04-08 RX ORDER — QUETIAPINE FUMARATE 25 MG/1
25 TABLET, FILM COATED ORAL 2 TIMES DAILY
Qty: 60 TABLET | Refills: 1 | Status: SHIPPED | OUTPATIENT
Start: 2024-04-08

## 2024-04-08 RX ORDER — NITROFURANTOIN 25; 75 MG/1; MG/1
100 CAPSULE ORAL 2 TIMES DAILY
Qty: 10 CAPSULE | Refills: 0 | Status: SHIPPED | OUTPATIENT
Start: 2024-04-08

## 2024-04-08 RX ORDER — METRONIDAZOLE 500 MG/1
500 TABLET ORAL
COMMUNITY
Start: 2024-04-02 | End: 2024-04-09

## 2024-04-08 ASSESSMENT — ASTHMA QUESTIONNAIRES
ACT_TOTALSCORE: 17
QUESTION_5 LAST FOUR WEEKS HOW WOULD YOU RATE YOUR ASTHMA CONTROL: SOMEWHAT CONTROLLED
QUESTION_4 LAST FOUR WEEKS HOW OFTEN HAVE YOU USED YOUR RESCUE INHALER OR NEBULIZER MEDICATION (SUCH AS ALBUTEROL): TWO OR THREE TIMES PER WEEK
QUESTION_3 LAST FOUR WEEKS HOW OFTEN DID YOUR ASTHMA SYMPTOMS (WHEEZING, COUGHING, SHORTNESS OF BREATH, CHEST TIGHTNESS OR PAIN) WAKE YOU UP AT NIGHT OR EARLIER THAN USUAL IN THE MORNING: ONCE A WEEK
QUESTION_1 LAST FOUR WEEKS HOW MUCH OF THE TIME DID YOUR ASTHMA KEEP YOU FROM GETTING AS MUCH DONE AT WORK, SCHOOL OR AT HOME: A LITTLE OF THE TIME
QUESTION_2 LAST FOUR WEEKS HOW OFTEN HAVE YOU HAD SHORTNESS OF BREATH: ONCE OR TWICE A WEEK
ACT_TOTALSCORE: 17

## 2024-04-08 ASSESSMENT — PATIENT HEALTH QUESTIONNAIRE - PHQ9
SUM OF ALL RESPONSES TO PHQ QUESTIONS 1-9: 8
SUM OF ALL RESPONSES TO PHQ QUESTIONS 1-9: 8
10. IF YOU CHECKED OFF ANY PROBLEMS, HOW DIFFICULT HAVE THESE PROBLEMS MADE IT FOR YOU TO DO YOUR WORK, TAKE CARE OF THINGS AT HOME, OR GET ALONG WITH OTHER PEOPLE: SOMEWHAT DIFFICULT

## 2024-04-08 NOTE — RESULT ENCOUNTER NOTE
Started macrobid for positive UTI (not clean catch but patient symptomatic).     EASTON HENRIQUEZ, DO

## 2024-04-08 NOTE — PROGRESS NOTES
"  Assessment & Plan     Dysuria  Acute, UA not clean, however given urine dip positive for leukocyte esterase, patient symptomatic, recommend UTI treatment. (Was on flagyl, but not typical for UTI coverage)  - UA Macroscopic with reflex to Microscopic and Culture - Clinic Collect  - Urine Microscopic Exam  - Urine Culture  - nitroFURantoin macrocrystal-monohydrate (MACROBID) 100 MG capsule  Dispense: 10 capsule; Refill: 0    Episodic mood disorder (H24)  Chronic, not well controlled. Patient reports increased stressors. She would like to return to therapy. Mostly endorsing depressive symptoms. Will start with quetiapine given anxious symptoms and difficulty sleeping.   - QUEtiapine (SEROQUEL) 25 MG tablet  Dispense: 60 tablet; Refill: 1    Vaginal discharge  Acute, likely physiologic, but will have to follow up after UTI treatment.   - Chlamydia & Gonorrhea by PCR, GICH/Range - Clinic Collect  - Wet prep - Clinic Collect    Cervical cancer screening  - Pap Screen with HPV - recommended age 30 - 65 years      Ordering of each unique test  Prescription drug management  I spent a total of 21 minutes on the day of the visit.   Time spent by me doing chart review, history and exam, documentation and further activities per the note    MED REC REQUIRED  Post Medication Reconciliation Status:  Patient was not discharged from an inpatient facility or TCU  BMI  Estimated body mass index is 36.07 kg/m  as calculated from the following:    Height as of this encounter: 1.702 m (5' 7\").    Weight as of this encounter: 104.5 kg (230 lb 4.8 oz).   Weight management plan: Discussed healthy diet and exercise guidelines      See Patient Instructions    Shilpa Ramires is a 38 year old, presenting for the following health issues:  ER F/U (Patient states wants to be retested because I do not trust [facility]'s and they have misdiagnosed me before)        4/8/2024     2:48 PM   Additional Questions   Roomed by Gee HOLCOMB CMA   Accompanied " "by Son     History of Present Illness       Reason for visit:  Follow up and need hpv testing and other infections and std tests    She eats 2-3 servings of fruits and vegetables daily.She consumes 3 sweetened beverage(s) daily.She exercises with enough effort to increase her heart rate 10 to 19 minutes per day.  She exercises with enough effort to increase her heart rate 4 days per week.   She is taking medications regularly.       ED/UC Followup:    Facility:  [facility]  Date of visit: 04/02/202  Reason for visit: vaginal itching and discharge  Current Status: patient states having flank pain, still having discharge, wants to be retested because I do not trust [facility]'s and they have misdiagnosed me before. I am taking the antibiotic    Anxiety  -Patient has a hard time sleeping, unable to clean as patient's son will leave his stroller. He also has behaviors will he escapes.   -Patient is now housed.   -Patient reports she has a hard time initiating herself to get out of bed. She reports poor energy, has a hard time getting suellen in things.   -She denies SI or HI  -When she gets overwhelmed she will stand outside of the room.  -Patient just moved in in January      Review of Systems  Constitutional, HEENT, cardiovascular, pulmonary, gi and gu systems are negative, except as otherwise noted.      Objective    /70 (BP Location: Right arm, Patient Position: Sitting, Cuff Size: Adult Large)   Pulse 77   Temp 98.4  F (36.9  C) (Oral)   Resp 18   Ht 1.702 m (5' 7\")   Wt 104.5 kg (230 lb 4.8 oz)   LMP 03/29/2024 (Within Days)   SpO2 98%   BMI 36.07 kg/m    Body mass index is 36.07 kg/m .  Physical Exam   GENERAL: alert and no distress  NECK: no adenopathy, no asymmetry, masses, or scars   (female): normal female external genitalia, normal urethral meatus , vaginal discharge - copious, yellow, and green, vaginal skin findings: erythematous, and normal cervix, adnexae, and uterus without masses.  MS: no " gross musculoskeletal defects noted, no edema    Results for orders placed or performed in visit on 04/08/24 (from the past 24 hour(s))   UA Macroscopic with reflex to Microscopic and Culture - Clinic Collect    Specimen: Urine, Clean Catch   Result Value Ref Range    Color Urine Yellow Colorless, Straw, Light Yellow, Yellow    Appearance Urine Cloudy (A) Clear    Glucose Urine Negative Negative mg/dL    Bilirubin Urine Negative Negative    Ketones Urine Negative Negative mg/dL    Specific Gravity Urine 1.020 1.005 - 1.030    Blood Urine Trace (A) Negative    pH Urine 6.0 5.0 - 8.0    Protein Albumin Urine Negative Negative mg/dL    Urobilinogen Urine 0.2 0.2, 1.0 E.U./dL    Nitrite Urine Negative Negative    Leukocyte Esterase Urine Moderate (A) Negative   Wet prep - Clinic Collect    Specimen: Vagina; Swab   Result Value Ref Range    Trichomonas Absent Absent    Yeast Absent Absent    Clue Cells Absent Absent    WBCs/high power field 4+ (A) None   Urine Microscopic Exam   Result Value Ref Range    Bacteria Urine Many (A) None Seen /HPF    RBC Urine 10-25 (A) 0-2 /HPF /HPF    WBC Urine  (A) 0-5 /HPF /HPF    Squamous Epithelials Urine Many (A) None Seen /LPF    WBC Clumps Urine None Seen None Seen /HPF           Signed Electronically by: EASTON HENRIQUEZ DO

## 2024-04-09 LAB
BACTERIA UR CULT: NORMAL
C TRACH DNA SPEC QL PROBE+SIG AMP: NEGATIVE
N GONORRHOEA DNA SPEC QL NAA+PROBE: NEGATIVE

## 2024-04-10 LAB
BKR LAB AP GYN ADEQUACY: NORMAL
BKR LAB AP GYN INTERPRETATION: NORMAL
BKR LAB AP HPV REFLEX: NORMAL
BKR LAB AP LMP: NORMAL
BKR LAB AP PREVIOUS ABNL DX: NORMAL
BKR LAB AP PREVIOUS ABNORMAL: NORMAL
PATH REPORT.COMMENTS IMP SPEC: NORMAL
PATH REPORT.COMMENTS IMP SPEC: NORMAL
PATH REPORT.RELEVANT HX SPEC: NORMAL

## 2024-04-12 LAB
HUMAN PAPILLOMA VIRUS 16 DNA: NEGATIVE
HUMAN PAPILLOMA VIRUS 18 DNA: NEGATIVE
HUMAN PAPILLOMA VIRUS FINAL DIAGNOSIS: NORMAL
HUMAN PAPILLOMA VIRUS OTHER HR: NEGATIVE

## 2024-04-15 PROBLEM — D06.9 CIN III (CERVICAL INTRAEPITHELIAL NEOPLASIA III): Status: ACTIVE | Noted: 2020-09-17

## 2024-04-15 PROBLEM — O34.41: Status: ACTIVE | Noted: 2020-09-17

## 2024-04-23 ENCOUNTER — PATIENT OUTREACH (OUTPATIENT)
Dept: CARE COORDINATION | Facility: CLINIC | Age: 39
End: 2024-04-23
Payer: COMMERCIAL

## 2024-05-01 ENCOUNTER — TELEPHONE (OUTPATIENT)
Dept: FAMILY MEDICINE | Facility: CLINIC | Age: 39
End: 2024-05-01
Payer: COMMERCIAL

## 2024-05-01 DIAGNOSIS — G43.719 INTRACTABLE CHRONIC MIGRAINE WITHOUT AURA AND WITHOUT STATUS MIGRAINOSUS: Primary | ICD-10-CM

## 2024-05-01 NOTE — TELEPHONE ENCOUNTER
Order/Referral Request    Who is requesting: Patient    Orders being requested: Neurology    Reason service is needed/diagnosis: cluster headaches, migraines    When are orders needed by: at earliest convenience - per patient she forgot that a referral was placed last year  and she did not schedule appointment. Calling today with a migraine and will need new referral as it is about to     Has this been discussed with Provider: Yes    Does patient have a preference on a Group/Provider/Facility? N/A    Does patient have an appointment scheduled?: No    Where to send orders: Place orders within Epic    Could we send this information to you in [employer] or would you prefer to receive a phone call?:   Patient would like to be contacted via [employer]

## 2024-05-07 ENCOUNTER — PATIENT OUTREACH (OUTPATIENT)
Dept: CARE COORDINATION | Facility: CLINIC | Age: 39
End: 2024-05-07
Payer: COMMERCIAL

## 2024-05-08 ENCOUNTER — VIRTUAL VISIT (OUTPATIENT)
Dept: FAMILY MEDICINE | Facility: CLINIC | Age: 39
End: 2024-05-08
Payer: COMMERCIAL

## 2024-05-08 DIAGNOSIS — B37.31 CANDIDAL VULVOVAGINITIS: ICD-10-CM

## 2024-05-08 DIAGNOSIS — F17.200 NICOTINE DEPENDENCE, UNCOMPLICATED, UNSPECIFIED NICOTINE PRODUCT TYPE: Primary | ICD-10-CM

## 2024-05-08 PROCEDURE — 99213 OFFICE O/P EST LOW 20 MIN: CPT | Mod: 95 | Performed by: FAMILY MEDICINE

## 2024-05-08 RX ORDER — FLUCONAZOLE 150 MG/1
150 TABLET ORAL ONCE
Qty: 1 TABLET | Refills: 0 | Status: SHIPPED | OUTPATIENT
Start: 2024-05-08 | End: 2024-05-08

## 2024-05-08 RX ORDER — NICOTINE 21 MG/24HR
1 PATCH, TRANSDERMAL 24 HOURS TRANSDERMAL EVERY 24 HOURS
Qty: 42 PATCH | Refills: 0 | Status: SHIPPED | OUTPATIENT
Start: 2024-05-08 | End: 2024-06-19

## 2024-05-08 NOTE — PROGRESS NOTES
Keyla is a 38 year old who is being evaluated via a billable video visit.      Assessment & Plan     Nicotine dependence, uncomplicated, unspecified nicotine product type  Chronic, is down to 4 cigarettes (only smokes half) a day. However, she does report she needs to smoke first thing in the morning, so would recommend long term nicotine delivery. Patient has a complicated psychiatric history and would not feel comfortable trying chantix, has not tolerated wellbutrin in the past.   - [place] Quit Partner Referral  - nicotine (NICODERM CQ) 14 MG/24HR 24 hr patch  Dispense: 42 patch; Refill: 0  - nicotine (NICODERM CQ) 7 MG/24HR 24 hr patch  Dispense: 14 patch; Refill: 0  - SMOKING CESSATION COUNSELING >10 MIN  - nicotine (NICORETTE) 2 MG gum  Dispense: 100 each; Refill: 5  - nicotine polacrilex (NICORETTE) 4 MG gum  Dispense: 100 each; Refill: 5    Candidal vulvovaginitis  Acute, likely side effect from antibiotics from prior visit.  - fluconazole (DIFLUCAN) 150 MG tablet  Dispense: 1 tablet; Refill: 0      Prescription drug management  I spent a total of 27 minutes on the day of the visit.   Time spent by me doing chart review, history and exam, documentation and further activities per the note      Nicotine/Tobacco Cessation  She reports that she has been smoking cigarettes. She has a 10 pack-year smoking history. She has never been exposed to tobacco smoke. She has quit using smokeless tobacco.  Nicotine/Tobacco Cessation Plan  Phone counseling: Place order for Quit Partner Referral  Pharmacotherapies : Nicotine patch and Nicotine gum        See Patient Instructions    Subjective   Keyla is a 38 year old, presenting for the following health issues:  Follow Up and Smoking Cessation        5/8/2024     3:33 PM   Additional Questions   Roomed by Remington MCKINLEY CMA       Video Start Time: 3:57 PM    History of Present Illness       Reason for visit:  Quite smoking    She eats 2-3 servings of fruits and vegetables daily.She  consumes 4 sweetened beverage(s) daily.She exercises with enough effort to increase her heart rate 10 to 19 minutes per day.  She exercises with enough effort to increase her heart rate 4 days per week.   She is taking medications regularly.     UTI symptoms resolved. She missed a day due to headaches.     Patient since started her mood stabilizer she reports she does not feel she can smoke a whole cigarette. Patient would like to quit to where she is not feeling compelled to smoke. Patient smokes 4 halves of cigarettes (two cigarettes worth). Keyla will start smoking when she wakes up.     Review of Systems  Constitutional, HEENT, cardiovascular, pulmonary, gi and gu systems are negative, except as otherwise noted.      Objective           Vitals:  No vitals were obtained today due to virtual visit.    Physical Exam   GENERAL: alert and no distress  EYES: Eyes grossly normal to inspection.  No discharge or erythema, or obvious scleral/conjunctival abnormalities.  RESP: No audible wheeze, cough, or visible cyanosis.    SKIN: Visible skin clear. No significant rash, abnormal pigmentation or lesions.  NEURO: Cranial nerves grossly intact.  Mentation and speech appropriate for age.  PSYCH: Appropriate affect, tone, and pace of words        Video-Visit Details    Type of service:  Video Visit   Originating Location (pt. Location): Home  Distant Location (provider location):  On-site  Platform used for Video Visit: Lilly  Signed Electronically by: EASTON HENRIQUEZ DO

## 2024-05-08 NOTE — PATIENT INSTRUCTIONS
Nicotine Transdermal System   Habitrol, Nicoderm C-Q    Uses  For quitting smoking.    Instructions  DO NOT take this medicine by mouth.    Avoid placing the patch near the breast.    Remove the patch after 24 hours.    Keep the medicine at room temperature. Avoid heat and direct light.    This patch should not be cut.    Wash your hands before and after handling this medicine.    Remove old patch before applying new one. Change the location of the new patch.    If you have vivid dreams or trouble sleeping, you may remove the patch before going to sleep.    Ask your doctor or pharmacist about locations on your body where this patch can be used.    Remove the plastic liner that protects the sticky side of the patch before applying to the skin.    Be sure the area of skin is clean and dry before putting on a new patch.    Apply the patch to a clean, dry, hairless area.    Press the patch firmly for a few seconds to make sure it stays in place.    After removing the patch, fold it together and discard it out of reach of children and pets.    Please ask your doctor or pharmacist how you can safely dispose of used patches.    If the skin under the patch becomes irritated, remove the patch. Do not apply a new patch to the area until the skin feels better.    To avoid irritating your skin, use a different location for a new patch.    Apply the patch only to normal looking skin. Avoid areas of the skin that are red, have scrapes, or damaged.    If the patch falls off, apply a new a patch on a different location of the body.    Please tell your doctor and pharmacist about all the medicines you take. Include both prescription and over-the-counter medicines. Also tell them about any vitamins, herbal medicines, or anything else you take for your health.    If you need to stop this medicine, your doctor may wish to gradually reduce the dosage before stopping.    Do not use more than 1 patch at any one time.    Cautions  Tell  your doctor and pharmacist if you ever had an allergic reaction to a medicine. Symptoms of an allergic reaction can include trouble breathing, skin rash, itching, swelling, or severe dizziness.    Do not use the medication any more than instructed.    Avoid smoking while on this medicine. Smoking may increase your risk for stroke, heart attack, blood clots, high blood pressure, and other diseases of the heart and blood vessels.    Tell the doctor or pharmacist if you are pregnant, planning to be pregnant, or breastfeeding.    Ask your pharmacist if this medicine can interact with any of your other medicines. Be sure to tell them about all the medicines you take.    Please tell all your doctors and dentists that you are on this medicine before they provide care.    Side Effects  The following is a list of some common side effects from this medicine. Please speak with your doctor about what you should do if you experience these or other side effects.    skin irritation where medicine is applied    If you have any of the following side effects, you may be getting too much medicine. Please contact your doctor to let them know about these side effects.    diarrhea  dizziness  nausea  rapid heartbeat  vomiting    A few people may have an allergic reaction to this medicine. Symptoms can include difficulty breathing, skin rash, itching, swelling, or severe dizziness. If you notice any of these symptoms, seek medical help quickly.    Extra  Please speak with your doctor, nurse, or pharmacist if you have any questions about this medicine.      [email or web address]  IMPORTANT NOTE: This document tells you briefly how to take your medicine, but it does not tell you all there is to know about it. Your doctor or pharmacist may give you other documents about your medicine. Please talk to them if you have any questions. Always follow their advice. There is a more complete description of this medicine  available in English. Scan this code on your smartphone or tablet or use the web address below. You can also ask your pharmacist for a printout. If you have any questions, please ask your pharmacist.   2021 [employer].      2581-5477 [employer]. All rights reserved. This information is not intended as a substitute for professional medical care. Always follow your healthcare professional's instructions.    Nicotine Chewing Gum (NICOTINE GUM - BUCCAL)  For quitting smoking.  Brand Name(s): Nicorelief, Nicorette, Thrive  Generic Name: Nicotine  Instructions  Chew the gum when you feel the urge to smoke. Chew very slowly until it tingles, then move it to the space between your cheek and gum. Keep it there until it stops tingling. When the tingle is gone, begin chewing the gum again until the tingle returns. Most of the nicotine will be gone after 30 minutes.  Do not eat or drink for 15 minutes before or during use of the gum.  Store at room temperature away from heat, light, and moisture. Do not keep in the bathroom.  Tell your doctor and pharmacist about all your medicines. Include prescription and over-the-counter medicines, vitamins, and herbal medicines.  Keep using this medicine for the full number of days that it is prescribed. Do not stop the medicine even if you start to feel better.  This medicine contains sodium. If you are on a low sodium diet, consider this as part of your total sodium diet.  If you have been told to follow a low sodium diet, speak to your doctor before using this medicine.  Do not take the medicine more than 24 times during 24 hours.  Cautions  Tell your doctor and pharmacist if you ever had an allergic reaction to a medicine.  Do not use the medication any more than instructed.  Avoid smoking while on this medicine. Smoking may increase your risk for stroke, heart attack, blood clots, high blood pressure, and other diseases of the heart and blood vessels.  Tell the  doctor or pharmacist if you are pregnant, planning to be pregnant, or breastfeeding.  Please tell all your doctors and dentists that you are on this medicine before they provide care.  Side Effects  The following is a list of some common side effects from this medicine. Please speak with your doctor about what you should do if you experience these or other side effects.  jaw pain  irritation of mouth and gum tissue  If you have any of the following side effects, you may be getting too much medicine. Please contact your doctor to let them know about these side effects.  diarrhea  dizziness  rapid heartbeat  nausea and vomiting  weakness  A few people may have an allergic reaction to this medicine. Symptoms can include difficulty breathing, skin rash, itching, swelling, or severe dizziness. If you notice any of these symptoms, seek medical help quickly.  Please speak with your doctor, nurse, or pharmacist if you have any questions about this medicine.  IMPORTANT NOTE: This document tells you briefly how to take your medicine, but it does not tell you all there is to know about it. Your doctor or pharmacist may give you other documents about your medicine. Please talk to them if you have any questions. Always follow their advice.  There is a more complete description of this medicine available in English. Scan this code on your smartphone or tablet or use the web address below. You can also ask your pharmacist for a printout. If you have any questions, please ask your pharmacist.  The display and use of this drug information is subject to Terms of Use.  More information about NICOTINE GUM - BUCCAL      Copyright(c) 2023 [employer]  Selected from data included with permission and copyright by [employer]. This copyrighted material has been downloaded from a licensed data provider and is not for distribution, except as may be authorized by the applicable terms of use.  Conditions of Use: The information  in this database is intended to supplement, not substitute for the expertise and judgment of healthcare professionals. The information is not intended to cover all possible uses, directions, precautions, drug interactions or adverse effects nor should it be construed to indicate that use of a particular drug is safe, appropriate or effective for you or anyone else. A healthcare professional should be consulted before taking any drug, changing any diet or commencing or discontinuing any course of treatment. The display and use of this drug information is subject to express Terms of Use.  Care instructions adapted under license by your healthcare professional. If you have questions about a medical condition or this instruction, always ask your healthcare professional. [employer] disclaims any warranty or liability for your use of this information.    Quitting Tobacco: Care Instructions  Quitting tobacco is much harder than simply changing a habit. Nicotine cravings make it hard to quit, but you can do it. Your doctor will help you set up the plan that best meets your needs.    You will miss the nicotine at first. You may feel short-tempered and grumpy. You may have trouble sleeping or thinking clearly. The urge to use tobacco may continue for a time.    Combining tools can raise your chances of success. You can use medicine along with counseling. And you can join a quit-tobacco program, such as the American Lung Association's Freedom from Smoking program.    Get support.  Reach out to family and friends, and find a counselor to help you quit. Join a support group, such as Nicotine Anonymous. Go to www.smokefree.gov to sign up for text messaging support.     Talk to your doctor or pharmacist about medicines that can help you quit.  Medicines can help with cravings and withdrawal symptoms. There are several over-the-counter choices, such as nicotine patches, gum, and lozenges.     After you quit, do not  "use tobacco again, not even once.  Get rid of all tobacco products and anything that reminds you of tobacco, such as ashtrays.     Avoid things that make you reach for tobacco.  Change your daily routine. Take a different route to work, or eat a meal in a different place.     Try to cut down on stress.  Find ways to calm yourself, such as taking a hot bath or doing deep breathing exercises.     Eat a healthy diet, and get regular exercise.  Having healthy habits may help you quit using tobacco.     Don't give up on quitting if you use tobacco again.  Most people quit and restart a few times before they quit for good.   Follow-up care is a key part of your treatment and safety. Be sure to make and go to all appointments, and call your doctor if you are having problems. It's also a good idea to know your test results and keep a list of the medicines you take.  Where can you learn more?  Go to https://www.[email or web address].net/patiented  Enter Y522 in the search box to learn more about \"Quitting Tobacco: Care Instructions.\"  Current as of: November 15, 2023               Content Version: 14.0    [id number] datatracker.   Care instructions adapted under license by your healthcare professional. If you have questions about a medical condition or this instruction, always ask your healthcare professional. [employer] disclaims any warranty or liability for your use of this information.      Nicotine Chewing Gum (NICOTINE GUM - BUCCAL)  For quitting smoking.  Brand Name(s): Nicorelief, Nicorette, Thrive  Generic Name: Nicotine  Instructions  Chew the gum when you feel the urge to smoke. Chew very slowly until it tingles, then move it to the space between your cheek and gum. Keep it there until it stops tingling. When the tingle is gone, begin chewing the gum again until the tingle returns. Most of the nicotine will be gone after 30 minutes.  Do not eat or drink for 15 minutes before or during use of the " gum.  Store at room temperature away from heat, light, and moisture. Do not keep in the bathroom.  Tell your doctor and pharmacist about all your medicines. Include prescription and over-the-counter medicines, vitamins, and herbal medicines.  Keep using this medicine for the full number of days that it is prescribed. Do not stop the medicine even if you start to feel better.  This medicine contains sodium. If you are on a low sodium diet, consider this as part of your total sodium diet.  If you have been told to follow a low sodium diet, speak to your doctor before using this medicine.  Do not take the medicine more than 24 times during 24 hours.  Cautions  Tell your doctor and pharmacist if you ever had an allergic reaction to a medicine.  Do not use the medication any more than instructed.  Avoid smoking while on this medicine. Smoking may increase your risk for stroke, heart attack, blood clots, high blood pressure, and other diseases of the heart and blood vessels.  Tell the doctor or pharmacist if you are pregnant, planning to be pregnant, or breastfeeding.  Please tell all your doctors and dentists that you are on this medicine before they provide care.  Side Effects  The following is a list of some common side effects from this medicine. Please speak with your doctor about what you should do if you experience these or other side effects.  jaw pain  irritation of mouth and gum tissue  If you have any of the following side effects, you may be getting too much medicine. Please contact your doctor to let them know about these side effects.  diarrhea  dizziness  rapid heartbeat  nausea and vomiting  weakness  A few people may have an allergic reaction to this medicine. Symptoms can include difficulty breathing, skin rash, itching, swelling, or severe dizziness. If you notice any of these symptoms, seek medical help quickly.  Please speak with your doctor, nurse, or pharmacist if you have any questions about this  medicine.  IMPORTANT NOTE: This document tells you briefly how to take your medicine, but it does not tell you all there is to know about it. Your doctor or pharmacist may give you other documents about your medicine. Please talk to them if you have any questions. Always follow their advice.  There is a more complete description of this medicine available in English. Scan this code on your smartphone or tablet or use the web address below. You can also ask your pharmacist for a printout. If you have any questions, please ask your pharmacist.  The display and use of this drug information is subject to Terms of Use.  More information about NICOTINE GUM - BUCCAL      Copyright(c) 2023 [employer]  Selected from data included with permission and copyright by [employer]. This copyrighted material has been downloaded from a licensed data provider and is not for distribution, except as may be authorized by the applicable terms of use.  Conditions of Use: The information in this database is intended to supplement, not substitute for the expertise and judgment of healthcare professionals. The information is not intended to cover all possible uses, directions, precautions, drug interactions or adverse effects nor should it be construed to indicate that use of a particular drug is safe, appropriate or effective for you or anyone else. A healthcare professional should be consulted before taking any drug, changing any diet or commencing or discontinuing any course of treatment. The display and use of this drug information is subject to express Terms of Use.  Care instructions adapted under license by your healthcare professional. If you have questions about a medical condition or this instruction, always ask your healthcare professional. [employer], [email or web address] disclaims any warranty or liability for your use of this information.

## 2024-06-02 ENCOUNTER — NURSE TRIAGE (OUTPATIENT)
Dept: NURSING | Facility: CLINIC | Age: 39
End: 2024-06-02
Payer: COMMERCIAL

## 2024-06-02 NOTE — TELEPHONE ENCOUNTER
"Pt is phoning stating that pt is having stomach pain and has burning in her throat from what she states is acid reflux     Pt states that the acid reflux started yesterday 06/01/20224    Rates abdominal pain 9/10 - pt was moaning in pain at times while on the phone with writer     Pt is unable to sleep at night because of abdominal pain     Pt is having diarrhea     Pt states that she is burping rotten eggs     Pt states that he worst symptom is her abdominal pain     Pt is drinking fluids and urinating     No fever     No vomiting     Per disposition: Go to ED Now     Pt will be going to ED now for evaluation     Care advice given per protocol and when to call back. Pt verbalized understanding and agrees to plan of care.    Blanca Encinas RN  [facility] Nurse Advisor  8:27 AM 6/2/2024        Reason for Disposition   [1] SEVERE pain (e.g., excruciating) AND [2] present > 1 hour    Additional Information   Negative: Shock suspected (e.g., cold/pale/clammy skin, too weak to stand, low BP, rapid pulse)   Negative: Difficult to awaken or acting confused (e.g., disoriented, slurred speech)   Negative: Sounds like a life-threatening emergency to the triager   Negative: Passed out (i.e., lost consciousness, collapsed and was not responding)   Negative: Followed an abdomen (stomach) injury   Negative: Chest pain   Negative: [1] Abdominal pain AND [2] pregnant < 20 weeks   Negative: [1] Abdominal pain AND [2] pregnant 20 or more weeks   Negative: [1] Abdominal pain AND [2] postpartum (from 0 to 6 weeks after delivery)   Negative: Pain is mainly in upper abdomen  (if needed ask: \"is it mainly above the belly button?\")   Negative: Abdomen bloating or swelling are main symptoms    Protocols used: Abdominal Pain - Female-A-AH    "

## 2024-06-29 ENCOUNTER — NURSE TRIAGE (OUTPATIENT)
Dept: NURSING | Facility: CLINIC | Age: 39
End: 2024-06-29
Payer: COMMERCIAL

## 2024-06-30 NOTE — TELEPHONE ENCOUNTER
Nurse Triage SBAR    Is this a 2nd Level Triage? NO    Situation/background: For the last couple of weeks has had an abscess on her gum.     Assessment: It will occasionally drain fluid that is white mixed with blood. Then it fills back up again. It is on her gum at the base of a tooth that is broken. She denies fever.     Protocol Recommended Disposition:   See PCP Within 24 Hours, See More Appropriate Guideline    Recommendation: Advised to be seen within 24 hours. Advised urgent care tomorrow, or if she wanted to be seen tonight then it would have to be the ED. Patient verbalizes understanding and confirms she knows hours and location. No additional questions.     Juan José Burns RN on 6/29/2024 at 9:48 PM    Reason for Disposition   Canker sore suspected (i.e., aphthous ulcer) in the mouth   [1] One pimple or ulcer on the gum AND [2] near a toothache    Additional Information   Negative: SEVERE difficulty breathing (e.g., struggling for each breath, speaks in single words, stridor)   Negative: Sounds like a life-threatening emergency to the triager   Negative: [1] Drooling or spitting out saliva (because can't swallow) AND [2] new-onset   Negative: [1] Bleeding from mouth AND [2] won't stop after 10 minutes of direct pressure   Negative: Electrical burn of mouth   Negative: Chemical burn of mouth   Negative: [1] Difficulty breathing AND [2] not severe   Negative: Patient sounds very sick or weak to the triager   Negative: [1] Bloody crusts on lips or sores in mouth AND [2] rash anywhere elese on body (back, chest, face, palms, soles)   Negative: [1] Gum bleeding AND [2] taking Coumadin (warfarin) or other strong blood thinner, or known bleeding disorder (e.g., thrombocytopenia)   Negative: [1] Dry mouth AND [2] urinating more frequently than usual (i.e., frequency)   Negative: [1] Dry mouth AND [2] drinking more liquids than usual (thirsty) AND [3] > 1 day (24 hours)   Negative: Chemical in the mouth suspected  cause of ulcers   Negative: [1] Drinking very little AND [2] dehydration suspected (e.g., no urine > 12 hours, very dry mouth, very lightheaded)   Negative: Generalized rash on body   Negative: Patient sounds very sick or weak to the triager   Negative: Large blisters in mouth (i.e., fluid filled bubbles or sacs)   Negative: [1] Bloody crusts on lips or sores in mouth AND [2] rash anywhere else on body (back, chest, face, palms, soles)   Negative: Gums are red, painful and have many ulcers   Negative: Fever    Protocols used: Mouth Symptoms-A-AH, Mouth Ulcers-A-AH

## 2024-07-11 ENCOUNTER — TELEPHONE (OUTPATIENT)
Dept: FAMILY MEDICINE | Facility: CLINIC | Age: 39
End: 2024-07-11
Payer: COMMERCIAL

## 2024-07-11 NOTE — LETTER
July 12, 2024      Keyla Arnold  [address] [place] [address]        To Whom It May Concern,     Keyla Arnold has been under my care since November 20, 2022 for mild persistent asthma. As you know, patients with asthma are sensitive to temperature extremes. Due to this year's weather and humidity, I recommend she have air conditioning to prevent any asthma exacerbations.       Sincerely,        EASTON HENRIQUEZ, DO

## 2024-07-11 NOTE — TELEPHONE ENCOUNTER
Reason for Call:  Other Letter for [employer]     Detailed comments: Patient needs letter for [employer] that it is medically necessary for her to have air conditioning for her asthma treatment. Patient would like letter sent to her via [employer]    Phone Number Patient can be reached at: Cell number on file:    Telephone Information:   Mobile [phone]       Best Time: any    Can we leave a detailed message on this number? YES    Call taken on 7/11/2024 at 4:13 PM by Ana Merrill

## 2024-08-25 ENCOUNTER — HEALTH MAINTENANCE LETTER (OUTPATIENT)
Age: 39
End: 2024-08-25

## 2024-10-18 ENCOUNTER — TELEPHONE (OUTPATIENT)
Dept: FAMILY MEDICINE | Facility: CLINIC | Age: 39
End: 2024-10-18
Payer: COMMERCIAL

## 2024-10-18 NOTE — TELEPHONE ENCOUNTER
Pt calling in with concerns about trichomoniasis.     She states she recently had UTI symptoms and they checked and she is positive for trichomoniasis. She states she has not had sexual intercourse since Feb and wondering how she would have a positive.     Pt states she does not use IV drugs and denies using sex toys that she would have used when she was positive originally.

## 2024-10-21 NOTE — TELEPHONE ENCOUNTER
It is possible that it is a resistant infection. I recommend prolonged treatment with a follow up visit to look for it via nucleic acid amplified testing rather than wet prep. Please let patient know, see if she is OK with this plan.

## 2024-10-24 NOTE — TELEPHONE ENCOUNTER
LMTCB. Closing encounter after 3 attempts to reach patient. [employer] message sent also.

## 2024-10-31 ENCOUNTER — MYC MEDICAL ADVICE (OUTPATIENT)
Dept: INTERNAL MEDICINE | Facility: CLINIC | Age: 39
End: 2024-10-31
Payer: COMMERCIAL

## 2024-11-04 NOTE — TELEPHONE ENCOUNTER
Pt called back, she has not had a phone in a couple weeks.    Writer relayed PCP recommendations and pt was agreeable.    Follow up appointment scheduled 11/13.    RUSSEL Montaño.

## 2024-11-13 ENCOUNTER — OFFICE VISIT (OUTPATIENT)
Dept: FAMILY MEDICINE | Facility: CLINIC | Age: 39
End: 2024-11-13
Payer: COMMERCIAL

## 2024-11-13 VITALS
SYSTOLIC BLOOD PRESSURE: 110 MMHG | DIASTOLIC BLOOD PRESSURE: 68 MMHG | HEIGHT: 67 IN | RESPIRATION RATE: 15 BRPM | WEIGHT: 232.1 LBS | HEART RATE: 80 BPM | OXYGEN SATURATION: 98 % | BODY MASS INDEX: 36.43 KG/M2 | TEMPERATURE: 98.1 F

## 2024-11-13 DIAGNOSIS — N89.8 VAGINAL DISCHARGE: ICD-10-CM

## 2024-11-13 DIAGNOSIS — K04.7 DENTAL ABSCESS: ICD-10-CM

## 2024-11-13 DIAGNOSIS — R05.1 ACUTE COUGH: Primary | ICD-10-CM

## 2024-11-13 DIAGNOSIS — N39.46 MIXED STRESS AND URGE URINARY INCONTINENCE: ICD-10-CM

## 2024-11-13 LAB — T VAGINALIS DNA SPEC QL NAA+PROBE: DETECTED

## 2024-11-13 PROCEDURE — 36415 COLL VENOUS BLD VENIPUNCTURE: CPT | Performed by: FAMILY MEDICINE

## 2024-11-13 PROCEDURE — 86615 BORDETELLA ANTIBODY: CPT | Mod: 90 | Performed by: FAMILY MEDICINE

## 2024-11-13 PROCEDURE — 99000 SPECIMEN HANDLING OFFICE-LAB: CPT | Performed by: FAMILY MEDICINE

## 2024-11-13 PROCEDURE — 87661 TRICHOMONAS VAGINALIS AMPLIF: CPT | Performed by: FAMILY MEDICINE

## 2024-11-13 PROCEDURE — G2211 COMPLEX E/M VISIT ADD ON: HCPCS | Performed by: FAMILY MEDICINE

## 2024-11-13 PROCEDURE — 99215 OFFICE O/P EST HI 40 MIN: CPT | Performed by: FAMILY MEDICINE

## 2024-11-13 RX ORDER — CHLORHEXIDINE GLUCONATE ORAL RINSE 1.2 MG/ML
15 SOLUTION DENTAL 2 TIMES DAILY
Qty: 473 ML | Refills: 2 | Status: SHIPPED | OUTPATIENT
Start: 2024-11-13

## 2024-11-13 RX ORDER — OXYBUTYNIN CHLORIDE 5 MG/1
5 TABLET, EXTENDED RELEASE ORAL DAILY
Qty: 30 TABLET | Refills: 1 | Status: SHIPPED | OUTPATIENT
Start: 2024-11-13

## 2024-11-13 RX ORDER — AZITHROMYCIN 250 MG/1
TABLET, FILM COATED ORAL
Qty: 6 TABLET | Refills: 0 | Status: SHIPPED | OUTPATIENT
Start: 2024-11-13 | End: 2024-11-18

## 2024-11-13 RX ORDER — CETIRIZINE HYDROCHLORIDE 10 MG/1
10 TABLET ORAL DAILY
Qty: 90 TABLET | Refills: 3 | Status: SHIPPED | OUTPATIENT
Start: 2024-11-13

## 2024-11-13 ASSESSMENT — ASTHMA QUESTIONNAIRES
QUESTION_2 LAST FOUR WEEKS HOW OFTEN HAVE YOU HAD SHORTNESS OF BREATH: NOT AT ALL
QUESTION_4 LAST FOUR WEEKS HOW OFTEN HAVE YOU USED YOUR RESCUE INHALER OR NEBULIZER MEDICATION (SUCH AS ALBUTEROL): NOT AT ALL
QUESTION_2 LAST FOUR WEEKS HOW OFTEN HAVE YOU HAD SHORTNESS OF BREATH: ONCE A DAY
QUESTION_5 LAST FOUR WEEKS HOW WOULD YOU RATE YOUR ASTHMA CONTROL: COMPLETELY CONTROLLED
ACT_TOTALSCORE: 24
QUESTION_4 LAST FOUR WEEKS HOW OFTEN HAVE YOU USED YOUR RESCUE INHALER OR NEBULIZER MEDICATION (SUCH AS ALBUTEROL): NOT AT ALL
QUESTION_1 LAST FOUR WEEKS HOW MUCH OF THE TIME DID YOUR ASTHMA KEEP YOU FROM GETTING AS MUCH DONE AT WORK, SCHOOL OR AT HOME: NONE OF THE TIME
ACT_TOTALSCORE: 19
QUESTION_1 LAST FOUR WEEKS HOW MUCH OF THE TIME DID YOUR ASTHMA KEEP YOU FROM GETTING AS MUCH DONE AT WORK, SCHOOL OR AT HOME: SOME OF THE TIME
QUESTION_3 LAST FOUR WEEKS HOW OFTEN DID YOUR ASTHMA SYMPTOMS (WHEEZING, COUGHING, SHORTNESS OF BREATH, CHEST TIGHTNESS OR PAIN) WAKE YOU UP AT NIGHT OR EARLIER THAN USUAL IN THE MORNING: ONCE OR TWICE
QUESTION_5 LAST FOUR WEEKS HOW WOULD YOU RATE YOUR ASTHMA CONTROL: WELL CONTROLLED
QUESTION_3 LAST FOUR WEEKS HOW OFTEN DID YOUR ASTHMA SYMPTOMS (WHEEZING, COUGHING, SHORTNESS OF BREATH, CHEST TIGHTNESS OR PAIN) WAKE YOU UP AT NIGHT OR EARLIER THAN USUAL IN THE MORNING: NOT AT ALL
ACT_TOTALSCORE: 19

## 2024-11-13 NOTE — PATIENT INSTRUCTIONS
Call the dentist as the cleaning is needed so that you can do the surgical procedure.  Start the bladder medication for the urgency.   We will do the swabs today for trich. If it is positive, I will treat and figure something out!   Please follow up in 1-2 months.

## 2024-11-13 NOTE — PROGRESS NOTES
"  Assessment & Plan     Acute cough  Acute, large concern for whooping cough given presentation. Given it is within one week onset, recommend z natan and will run titers. Also noted cobblestoning of oropharynx on exam.   - azithromycin (ZITHROMAX) 250 MG tablet  Dispense: 6 tablet; Refill: 0  - Bordetalla pertussis Shiela IgG with Reflex  - cetirizine (ZYRTEC) 10 MG tablet  Dispense: 90 tablet; Refill: 3  - Bordetalla pertussis Shiela IgG with Reflex    Vaginal discharge  Acute, recurrent. Patient with trichomonas on PCR in outside facility. Was given high dose flagyl, patient did not take as directed, so potentially under treated. Repeat swab today.   - Trichomonas vaginalis DNA PCR  - Trichomonas vaginalis DNA PCR    Dental abscess  Acute, no current infection symptoms. Recommend chlorhexidine mouth wash to prevent further biofilm build up given patient has stopped brushing. Encouraged her highly to go to dental clinic as soon as possible.   - chlorhexidine (PERIDEX) 0.12 % solution  Dispense: 473 mL; Refill: 2    Mixed stress and urge urinary incontinence  Chronic, likely mixed trauma with urgency.   - oxyBUTYnin ER (DITROPAN XL) 5 MG 24 hr tablet  Dispense: 30 tablet; Refill: 1    The longitudinal plan of care for the diagnosis(es)/condition(s) as documented were addressed during this visit. Due to the added complexity in care, I will continue to support Keyla in the subsequent management and with ongoing continuity of care.      MED REC REQUIRED  Post Medication Reconciliation Status:  Patient was not discharged from an inpatient facility or TCU  BMI  Estimated body mass index is 36.35 kg/m  as calculated from the following:    Height as of this encounter: 1.702 m (5' 7\").    Weight as of this encounter: 105.3 kg (232 lb 1.6 oz).   Weight management plan: Discussed healthy diet and exercise guidelines      See Patient Instructions    Subjective   Keyla is a 39 year old, presenting for the following health " "issues:  Hospital F/U        11/13/2024     2:43 PM   Additional Questions   Roomed by Corrie PRETTY     ED/UC Followup:    Facility:  [facility]  Date of visit: 10/21/24  Reason for visit: Facial swelling  Current Status: still having some odor after taking all antibiotics     Patient afraid to brush her teeth as she typically gets swelling on the side of brushing. Did not get a fever, did experience pain.    Other infection:   -Patient reports problems occurring with her period or when she wears underwear  -Starts as itching with change in odor   -Patient typically wears yoga pants  -Patient does not douche, sets in a vinegar tub    Leakage  -Occurring with coughing and sneezing, does not notice until she needs to pee.   -Patient's symptoms worsened after birth of youngest child  -Patient did report tearing with three of her vaginal deliveries.   -No burning or pain with this occurring.     Cough  -Whooping barking cough.   -patient still smoking  -    Review of Systems  Constitutional, HEENT, cardiovascular, pulmonary, gi and gu systems are negative, except as otherwise noted.      Objective    /68   Pulse 80   Temp 98.1  F (36.7  C) (Oral)   Resp 15   Ht 1.702 m (5' 7\")   Wt 105.3 kg (232 lb 1.6 oz)   LMP 11/10/2024 (Approximate)   SpO2 98%   Breastfeeding No   BMI 36.35 kg/m    Body mass index is 36.35 kg/m .  Physical Exam   GENERAL: alert and no distress  EYES: Eyes grossly normal to inspection, PERRL and conjunctivae and sclerae normal  RESP: No respiratory distress, loud dry, inspiratory whooping and barking cough with paroxysm  MS: no gross musculoskeletal defects noted, no edema  SKIN: no suspicious lesions or rashes    No results found for this or any previous visit (from the past 24 hours).        Signed Electronically by: EASTON HENRIQUEZ DO    "

## 2024-11-13 NOTE — PROGRESS NOTES
"  {PROVIDER CHARTING PREFERENCE:041901}    Shilpa Ramires is a 39 year old, presenting for the following health issues:  Hospital F/U        11/13/2024     2:43 PM   Additional Questions   Roomed by Corrie PRETTY     {MA/LPN/RN Pre-Provider Visit Orders- hCG/UA/Strep (Optional):620962}  {SUPERLIST (Optional):082866}  {additonal problems for provider to add (Optional):446946}    {ROS Picklists (Optional):564591}      Objective    /68   Pulse 80   Temp 98.1  F (36.7  C) (Oral)   Resp 15   Ht 1.702 m (5' 7\")   Wt 105.3 kg (232 lb 1.6 oz)   LMP 11/10/2024 (Approximate)   SpO2 98%   Breastfeeding No   BMI 36.35 kg/m    Body mass index is 36.35 kg/m .  Physical Exam   {Exam List (Optional):831966}    {Diagnostic Test Results (Optional):381068}        Signed Electronically by: EASTON HENRIQUEZ DO  {Email feedback regarding this note to [email or web address]   :479831}  "

## 2024-11-15 ENCOUNTER — TELEPHONE (OUTPATIENT)
Dept: FAMILY MEDICINE | Facility: CLINIC | Age: 39
End: 2024-11-15
Payer: COMMERCIAL

## 2024-11-15 DIAGNOSIS — A59.9 TRICHOMONIASIS: Primary | ICD-10-CM

## 2024-11-15 RX ORDER — TINIDAZOLE 500 MG/1
TABLET ORAL
Qty: 112 TABLET | Refills: 0 | Status: SHIPPED | OUTPATIENT
Start: 2024-11-15

## 2024-11-15 RX ORDER — TINIDAZOLE 500 MG/1
1000 TABLET ORAL 3 TIMES DAILY
Qty: 84 TABLET | Refills: 0 | Status: SHIPPED | OUTPATIENT
Start: 2024-11-15 | End: 2024-11-29

## 2024-11-15 RX ORDER — TINIDAZOLE 500 MG/1
TABLET ORAL
Qty: 56 TABLET | Refills: 0 | Status: SHIPPED | OUTPATIENT
Start: 2024-11-15 | End: 2024-11-15

## 2024-11-15 RX ORDER — TINIDAZOLE 500 MG/1
2 TABLET ORAL
Qty: 56 TABLET | Refills: 0 | Status: SHIPPED | OUTPATIENT
Start: 2024-11-15 | End: 2024-11-15

## 2024-11-15 NOTE — TELEPHONE ENCOUNTER
Called patient and reviewed result message with her. Patient verbalized understanding with results and thanked for call. Patient had no questions.     Patient agreeable for treatment.

## 2024-11-15 NOTE — RESULT ENCOUNTER NOTE
Let Keyla know she still has trich, and since she has failed multiple rounds of metronidazole, she should do tinidazole. If she is still positive after this, I may send her to infectious disease. It is really IMPORTANT that she take the regimen as DIRECTED.     ORAL TINIDAZOLE (this can be crushed) 2 GM DAILY (all at the same time) for FOURTEEN days  +   2 gm vaginal TINIDAZOLE for seven days     Please let me know her response.

## 2024-11-15 NOTE — TELEPHONE ENCOUNTER
----- Message from EASTON HENRIQUEZ sent at 11/15/2024  7:32 AM CST -----  Let Keyla know she still has trich, and since she has failed multiple rounds of metronidazole, she should do tinidazole. If she is still positive after this, I may send her to infectious disease. It is really IMPORTANT that she take the regimen as DIRECTED.     ORAL TINIDAZOLE (this can be crushed) 2 GM DAILY (all at the same time) for FOURTEEN days  +   2 gm vaginal TINIDAZOLE for seven days     Please let me know her response.

## 2024-11-19 ENCOUNTER — PATIENT OUTREACH (OUTPATIENT)
Dept: CARE COORDINATION | Facility: CLINIC | Age: 39
End: 2024-11-19
Payer: COMMERCIAL

## 2024-11-19 LAB — B PERT IGG SER IA-ACNC: 5.3 IV

## 2024-11-20 ENCOUNTER — TELEPHONE (OUTPATIENT)
Dept: FAMILY MEDICINE | Facility: CLINIC | Age: 39
End: 2024-11-20
Payer: COMMERCIAL

## 2024-11-20 LAB
B PERT AB SPEC QL: POSITIVE
B PERT FHA IGG SER QL: POSITIVE
B PERT IGG SER QL IB: POSITIVE
B PERT IGG SER QL: ABNORMAL

## 2024-11-20 NOTE — TELEPHONE ENCOUNTER
----- Message from EASTON HENRIQUEZ sent at 11/19/2024  3:16 PM CST -----  Please reach out to [facility] and let them know they had positive pertussis titers, she has not had a recent vaccine (over 6 months ago)  so this is like a true positive.

## 2024-11-20 NOTE — TELEPHONE ENCOUNTER
Called [place] [facility] [phone] on confidential reporting line to notify of positive pertussis

## 2024-11-21 NOTE — TELEPHONE ENCOUNTER
"Call into clinic from Lila with [facility] to further discuss positive pertussis test results.    Lila states she needs contact phone number and address for the patient, as [facility] will interview the patient. Information provided.    Lila asks to clarify recent vaccinations for the patient. Provided information on recent TDAP vaccination, last TDAP vaccine 2/16/2021. Per Natasha Messer,  \"she has not had a recent vaccine (over 6 months ago)  so this is like a true positive\"    Provided clarification regarding what test was performed. B. Pertussis Antibody, IGG Immunoblot performed on 11/13/24.    Lila asks if patient had symptoms at time of positive test result. Reviewed symptoms reported from 11/13/24 office visit with Natasha Messer, DO  \"Acute cough  Acute, large concern for whooping cough given presentation. Given it is within one week onset, recommend z natan and will run titers. Also noted cobblestoning of oropharynx on exam.\"  Provided phone number for [facility]    Lila denies further questions or concerns at this time.    Sabra Kaiser RN  [facility]    "

## 2024-11-26 ENCOUNTER — OFFICE VISIT (OUTPATIENT)
Dept: URGENT CARE | Facility: URGENT CARE | Age: 39
End: 2024-11-26
Payer: COMMERCIAL

## 2024-11-26 VITALS
OXYGEN SATURATION: 98 % | RESPIRATION RATE: 20 BRPM | DIASTOLIC BLOOD PRESSURE: 80 MMHG | SYSTOLIC BLOOD PRESSURE: 112 MMHG | TEMPERATURE: 99.2 F | HEART RATE: 78 BPM

## 2024-11-26 DIAGNOSIS — K04.7 DENTAL INFECTION: Primary | ICD-10-CM

## 2024-11-26 DIAGNOSIS — R05.1 ACUTE COUGH: ICD-10-CM

## 2024-11-26 LAB
FLUAV AG SPEC QL IA: NEGATIVE
FLUBV AG SPEC QL IA: NEGATIVE

## 2024-11-26 PROCEDURE — 87804 INFLUENZA ASSAY W/OPTIC: CPT | Performed by: PHYSICIAN ASSISTANT

## 2024-11-26 PROCEDURE — 87635 SARS-COV-2 COVID-19 AMP PRB: CPT | Performed by: PHYSICIAN ASSISTANT

## 2024-11-26 PROCEDURE — 99214 OFFICE O/P EST MOD 30 MIN: CPT | Performed by: PHYSICIAN ASSISTANT

## 2024-11-26 RX ORDER — COVID-19 ANTIGEN TEST
1 KIT MISCELLANEOUS PRN
Qty: 2 KIT | Refills: 0 | Status: SHIPPED | OUTPATIENT
Start: 2024-11-26

## 2024-11-26 RX ORDER — LEVOFLOXACIN 500 MG/1
500 TABLET, FILM COATED ORAL DAILY
Qty: 7 TABLET | Refills: 0 | Status: SHIPPED | OUTPATIENT
Start: 2024-11-26

## 2024-11-26 NOTE — PATIENT INSTRUCTIONS
1) Take antibiotic as prescribed. Take this medication with food to avoid stomach upset.   2) Ibuprofen or Tylenol as needed for fever or pain.  3) Continue to with plan to follow up with dentist.   4) You will be called if any flu or COVID tests are positive.

## 2024-11-26 NOTE — PROGRESS NOTES
Patient presents with:  Ear Problem: L ear pain/cough/tooth pain/SOB X2 wks         Clinical Decision Making:  Patient experiencing left ear pain and left dental pain.  She does have a history of feeling that has fallen out within the past year.  Will cover for infection with Levaquin.  Patient has allergy to penicillins.  This would also cover for pneumonia given respiratory symptoms although patient's lungs are CTA.  Patient has a planned follow-up with dentistry already.  No current findings concerning for abscess.  Due to high risk individuals being in her home she is interested in testing for COVID and influenza.  Influenza test is negative today; COVID test in process.  Recommend supportive cares with Tylenol for pain management.      ICD-10-CM    1. Dental infection  K04.7 levofloxacin (LEVAQUIN) 500 MG tablet      2. Acute cough  R05.1 COVID-19 Virus (Coronavirus) by PCR Nose     Influenza A & B Antigen - Clinic Collect     COVID-19 At-Home Test KIT          Patient Instructions   1) Take antibiotic as prescribed. Take this medication with food to avoid stomach upset.   2) Ibuprofen or Tylenol as needed for fever or pain.  3) Continue to with plan to follow up with dentist.   4) You will be called if any flu or COVID tests are positive.       HPI:  Keyla Arnold is a 39 year old female who presents today with concerns of left ear pain that started 2 weeks ago. Patient then started having cold symptoms about 2 days ago and left sided dental pain yesterday. Patient had a filling that fell out about a year ago, but didn't have pain until recently. No known fevers. She reports difficulty smelling despite being able to     Patient was seen in the ED for right tooth infection last month. Dentist appointment set up for next month. She has had 3 rounds of abx in the past year for issues with this tooth.     History obtained from the patient.    Problem List:  2021-03: History of herpes genitalis  2020-10: Excessive  weight gain affecting pregnancy  2020-10: Supervision of high risk pregnancy due to social problems,   antepartum  2020-10: Intractable chronic migraine without aura and without status   migrainosus  2020-10: Medication overuse headache  2020-10: Mild intermittent asthma without complication  2020: High risk pregnancy, antepartum  2020: PANCHO III (cervical intraepithelial neoplasia III)  2020: History of shoulder dystocia in prior pregnancy, currently   pregnant  2020: Prior fetal macrosomia, antepartum  2020: Quit smoking  2018: History of cervical dysplasia  2018: Acute bilateral low back pain with bilateral sciatica  2018: Class 2 obesity  2017-10: Prolonged posttraumatic stress disorder  2017: DENEEN (generalized anxiety disorder)  2017: ADHD (attention deficit hyperactivity disorder), inattentive   type  2017: Impaired verbal expression in adult  2015: Learning disability  2014: Shingles rash  2012: Anemia of mother, complicating pregnancy, childbirth, or the   puerperium, unspecified as to episode of care(648.20)  2012:  (normal spontaneous vaginal delivery)  2012: Other immediate postpartum hemorrhage, unspecified as to   episode of care  2012: Shoulder dystocia  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    Social History     Tobacco Use    Smoking status: Every Day     Current packs/day: 0.50     Average packs/day: 0.5 packs/day for 20.0 years (10.0 ttl pk-yrs)     Types: Cigarettes     Passive exposure: Never    Smokeless tobacco: Former   Substance Use Topics    Alcohol use: No         Review of Systems    Vitals:    24 1640   BP: 112/80   Pulse: 78   Resp: 20   Temp: 99.2  F (37.3  C)   SpO2: 98%       Physical Exam  Vitals and nursing note reviewed.   Constitutional:       General: She is not in acute distress.     Appearance: She  is not toxic-appearing or diaphoretic.   HENT:      Head: Normocephalic and atraumatic.      Right Ear: Tympanic membrane, ear canal and external ear normal.      Left Ear: Tympanic membrane, ear canal and external ear normal.      Mouth/Throat:      Pharynx: No oropharyngeal exudate, posterior oropharyngeal erythema or uvula swelling.      Tonsils: No tonsillar exudate or tonsillar abscesses.     Eyes:      Conjunctiva/sclera: Conjunctivae normal.   Cardiovascular:      Rate and Rhythm: Normal rate and regular rhythm.      Heart sounds: No murmur heard.  Pulmonary:      Effort: Pulmonary effort is normal. No respiratory distress.      Breath sounds: Normal breath sounds. No stridor. No wheezing or rales.   Neurological:      Mental Status: She is alert.   Psychiatric:         Mood and Affect: Mood normal.         Behavior: Behavior normal.         Thought Content: Thought content normal.         Judgment: Judgment normal.         Results:  Results for orders placed or performed in visit on 11/26/24   Influenza A & B Antigen - Clinic Collect     Status: Normal    Specimen: Nose; Swab   Result Value Ref Range    Influenza A antigen Negative Negative    Influenza B antigen Negative Negative    Narrative    Test results must be correlated with clinical data. If necessary, results should be confirmed by a molecular assay or viral culture.         At the end of the encounter, I discussed results, diagnosis, medications. Discussed red flags for immediate return to clinic/ER, as well as indications for follow up if no improvement. Patient understood and agreed to plan. Patient was stable for discharge.

## 2024-11-27 LAB — SARS-COV-2 RNA RESP QL NAA+PROBE: NEGATIVE

## 2024-12-04 ENCOUNTER — HOSPITAL ENCOUNTER (OUTPATIENT)
Dept: GENERAL RADIOLOGY | Facility: HOSPITAL | Age: 39
Discharge: HOME | End: 2024-12-04
Attending: PHYSICIAN ASSISTANT
Payer: COMMERCIAL

## 2024-12-04 ENCOUNTER — OFFICE VISIT (OUTPATIENT)
Dept: URGENT CARE | Facility: URGENT CARE | Age: 39
End: 2024-12-04
Payer: COMMERCIAL

## 2024-12-04 VITALS
BODY MASS INDEX: 36.23 KG/M2 | HEART RATE: 74 BPM | DIASTOLIC BLOOD PRESSURE: 74 MMHG | WEIGHT: 231.3 LBS | TEMPERATURE: 98.5 F | SYSTOLIC BLOOD PRESSURE: 155 MMHG | OXYGEN SATURATION: 95 % | RESPIRATION RATE: 18 BRPM

## 2024-12-04 DIAGNOSIS — R05.1 ACUTE COUGH: ICD-10-CM

## 2024-12-04 DIAGNOSIS — R05.3 CHRONIC COUGH: Primary | ICD-10-CM

## 2024-12-04 DIAGNOSIS — J45.20 MILD INTERMITTENT ASTHMA WITHOUT COMPLICATION: ICD-10-CM

## 2024-12-04 DIAGNOSIS — J45.31 MILD PERSISTENT ASTHMA WITH EXACERBATION: ICD-10-CM

## 2024-12-04 PROCEDURE — 71046 X-RAY EXAM CHEST 2 VIEWS: CPT

## 2024-12-04 PROCEDURE — 99214 OFFICE O/P EST MOD 30 MIN: CPT | Performed by: PHYSICIAN ASSISTANT

## 2024-12-04 RX ORDER — ALBUTEROL SULFATE 90 UG/1
2 INHALANT RESPIRATORY (INHALATION) EVERY 4 HOURS PRN
Qty: 18 G | Refills: 2 | Status: SHIPPED | OUTPATIENT
Start: 2024-12-04

## 2024-12-04 RX ORDER — ALBUTEROL SULFATE 90 UG/1
1-2 INHALANT RESPIRATORY (INHALATION) EVERY 4 HOURS PRN
Qty: 17 G | Refills: 1 | Status: SHIPPED | OUTPATIENT
Start: 2024-12-04

## 2024-12-04 RX ORDER — PREDNISONE 20 MG/1
40 TABLET ORAL DAILY
Qty: 10 TABLET | Refills: 0 | Status: SHIPPED | OUTPATIENT
Start: 2024-12-04 | End: 2024-12-04

## 2024-12-04 RX ORDER — BUDESONIDE 0.5 MG/2ML
0.5 INHALANT ORAL 2 TIMES DAILY
Qty: 60 ML | Refills: 1 | Status: SHIPPED | OUTPATIENT
Start: 2024-12-04

## 2024-12-04 RX ORDER — PREDNISONE 20 MG/1
40 TABLET ORAL DAILY
Qty: 10 TABLET | Refills: 0 | Status: SHIPPED | OUTPATIENT
Start: 2024-12-04

## 2024-12-04 NOTE — PROGRESS NOTES
"Assessment & Plan     Cough:  pt has ongoing cough for several weeks.  She has had positive pertussis test on 11-13-24. Discussed with pt that the natural course of this infection is that she will likely cough up to 12 weeks even when treated appropriately.  She feels this is somewhat different and did improve after zithromax tx, though she may have had improvement it is possible to have ongoing cough.  She does also have reactive airway and does use tobacco, off inhalers. Will have her restart, use albuterol every 4 hours.  Will plan on restarting pulmicort bid until feeling better.  If this cough is related to pertussis the ICS and RANJITH may not be particularly helpful.  Reassured pt sats are in the normal range and CXR was normal today.      - XR Chest 2 Views    Mild persistent asthma with exacerbation  Short course of prednisone and restart ICS and RANJITH   - predniSONE (DELTASONE) 20 MG tablet  Dispense: 10 tablet; Refill: 0  - albuterol (PROAIR HFA/PROVENTIL HFA/VENTOLIN HFA) 108 (90 Base) MCG/ACT inhaler  Dispense: 18 g; Refill: 2         Hina Bhardwaj PA-C  [facility] [place]    Shilpa Ramires is a 39 year old female who presents to clinic today for the following health issues:  Chief Complaint   Patient presents with    Cough     Seen in  for acute cough on 11/26 was negative for Influenza & Covid, cough persists and is worsening. Patient reports a few days ago she was feeling feverish. Chest area \"feels funny\" with pressure, increasing SOB.  Currently taking prescribed antibiotic.        HPI     Rhinorrhea, congestion, cough.  Positive for pertussis 11-13-24, treated appropriately with zithromax. Pt reports cough improved and then worsened again.  No fevers.  No nausea, vomiting. Just finished 7 day course of  levaquin for dental infection, did not help her cough.  Does have a hx of asthma, has been off pulmicort and needs refill of albuterol.    No CP.  Does use tobacco.  "     Review of Systems  Constitutional, HEENT, cardiovascular, pulmonary, gi and gu systems are negative, except as otherwise noted.      Objective    BP (!) 155/74 (BP Location: Right arm, Patient Position: Sitting, Cuff Size: Adult Regular)   Pulse 74   Temp 98.5  F (36.9  C) (Oral)   Resp 18   Wt 104.9 kg (231 lb 4.8 oz)   LMP 11/26/2024   SpO2 95%   BMI 36.23 kg/m    Physical Exam   Pt is in no acute distress and appears well  Ears patent B:  TM s intact, non-injected. All land marks easily visibile    Nasal mucosa is non-edematous, no discharge.    Pharynx: non erythematous, tonsils non hypertrophied, No exudate   Neck supple: no adenopathy  Lungs: mild expiratory wheezing, no rhonchi or rales    Heart: RRR, no murmur, no thrills or heaves   Ext: no edema  Skin: no rashes    Results for orders placed or performed during the hospital encounter of 12/04/24   XR Chest 2 Views     Status: None    Narrative    EXAM: XR CHEST 2 VIEWS  LOCATION: [facility]  DATE: 12/4/2024    INDICATION: Cough.  COMPARISON: 2.15.2023.      Impression    IMPRESSION: Negative chest. Lungs are clear.

## 2024-12-05 NOTE — PATIENT INSTRUCTIONS
Restart pulmicort and albuterol as discussed.  Trial of short course of prednisone for your reactive airway.    This cough may be due to your recent pertussis infection.  This has been appropriately treated but cough may persist months.  See attached information.

## 2025-01-10 ENCOUNTER — TELEPHONE (OUTPATIENT)
Dept: FAMILY MEDICINE | Facility: CLINIC | Age: 40
End: 2025-01-10
Payer: COMMERCIAL

## 2025-01-10 ENCOUNTER — MEDICAL CORRESPONDENCE (OUTPATIENT)
Dept: HEALTH INFORMATION MANAGEMENT | Facility: CLINIC | Age: 40
End: 2025-01-10
Payer: COMMERCIAL

## 2025-01-10 NOTE — TELEPHONE ENCOUNTER
[facility] - Bath Seat W/Back & Arms 275LB CAP     Form received and placed on PCP's desk for completion.

## 2025-01-28 ENCOUNTER — TELEPHONE (OUTPATIENT)
Dept: FAMILY MEDICINE | Facility: CLINIC | Age: 40
End: 2025-01-28
Payer: COMMERCIAL

## 2025-01-28 NOTE — TELEPHONE ENCOUNTER
Pt calling in regards to right abdominal pain (dull ache that comes goes), worse at night.    Pain has been ongoing for about a week.    No other symptoms.    RUSSEL Montaño.

## 2025-04-02 ENCOUNTER — TELEPHONE (OUTPATIENT)
Dept: FAMILY MEDICINE | Facility: CLINIC | Age: 40
End: 2025-04-02
Payer: COMMERCIAL

## 2025-04-02 DIAGNOSIS — M54.50 CHRONIC LOW BACK PAIN WITHOUT SCIATICA, UNSPECIFIED BACK PAIN LATERALITY: Primary | ICD-10-CM

## 2025-04-02 DIAGNOSIS — G89.29 CHRONIC LOW BACK PAIN WITHOUT SCIATICA, UNSPECIFIED BACK PAIN LATERALITY: Primary | ICD-10-CM

## 2025-04-02 NOTE — TELEPHONE ENCOUNTER
Johanna calls stating that an order for a bath seat was received but the diagnosis was labeled as incontinence so it is not a valid RX. States the form was sent back and signed but the diagnosis code needs to be updated. They are able to take a wet signature/change on the original order or a new order can be written up.     Fax: [phone]

## 2025-04-02 NOTE — TELEPHONE ENCOUNTER
Form found in media tab. Writer printed form off and placed in covering provider Nitesh Fay's middle bin at desk

## 2025-04-04 PROBLEM — M54.50 CHRONIC LOW BACK PAIN WITHOUT SCIATICA, UNSPECIFIED BACK PAIN LATERALITY: Status: ACTIVE | Noted: 2025-04-04

## 2025-04-04 PROBLEM — G89.29 CHRONIC LOW BACK PAIN WITHOUT SCIATICA, UNSPECIFIED BACK PAIN LATERALITY: Status: ACTIVE | Noted: 2025-04-04

## 2025-04-04 NOTE — TELEPHONE ENCOUNTER
Pls confirm and inquire more about  the patient if she has a PHYSICAL disability, or balance problems, which requires her to using a bath seat for stability.

## 2025-04-04 NOTE — TELEPHONE ENCOUNTER
Spoke with patient regarding need for bath seat. She stated she has lower back problems to where she can't stand for long. Also she gets frequent migraines where she will get dizzy with standing. She stated having a bath seat will help so she doesn't fall in the shower whiling bathing.

## 2025-05-06 ENCOUNTER — TELEPHONE (OUTPATIENT)
Dept: FAMILY MEDICINE | Facility: CLINIC | Age: 40
End: 2025-05-06
Payer: COMMERCIAL

## 2025-05-06 DIAGNOSIS — T63.461A ALLERGIC REACTION TO WASP STING: Primary | ICD-10-CM

## 2025-05-06 RX ORDER — EPINEPHRINE 0.3 MG/.3ML
0.3 INJECTION SUBCUTANEOUS PRN
Qty: 2 EACH | Refills: 1 | Status: SHIPPED | OUTPATIENT
Start: 2025-05-06

## 2025-05-06 NOTE — TELEPHONE ENCOUNTER
Please advise thoughts on continuing Benadryl or if EpiPen is needed.   Patient has allergy to bee sting. Was seen 9/8/2023 for bee sting and Benadryl was advised.   Patient has upcoming appointment on 5/23/2025 with Dr. Dempsey.

## 2025-05-06 NOTE — TELEPHONE ENCOUNTER
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     She has been stung around her (face)   took benadryl     Recently wasp went on her foot   -was red when landed   - stung her and ankle swelled up    Patient is asking about a EpiPen for the future incase she gets stung around her neck face area     -She can take benadryl for other areas     appt on 5/23/2025 but looking ot get sooner incase of a sting     Could we send this information to you in [email or web address] or would you prefer to receive a phone call?:   Patient would prefer a phone call   Okay to leave a detailed message?: Yes at Cell number on file:    Telephone Information:   Mobile [phone]     [employer] message works as well

## 2025-05-23 ENCOUNTER — OFFICE VISIT (OUTPATIENT)
Dept: FAMILY MEDICINE | Facility: CLINIC | Age: 40
End: 2025-05-23
Payer: COMMERCIAL

## 2025-05-23 VITALS
WEIGHT: 240.2 LBS | RESPIRATION RATE: 20 BRPM | OXYGEN SATURATION: 98 % | HEIGHT: 67 IN | TEMPERATURE: 98.2 F | SYSTOLIC BLOOD PRESSURE: 111 MMHG | HEART RATE: 66 BPM | BODY MASS INDEX: 37.7 KG/M2 | DIASTOLIC BLOOD PRESSURE: 75 MMHG

## 2025-05-23 DIAGNOSIS — Z00.00 ROUTINE GENERAL MEDICAL EXAMINATION AT A HEALTH CARE FACILITY: Primary | ICD-10-CM

## 2025-05-23 DIAGNOSIS — G44.009 MIGRAINE-CLUSTER HEADACHE SYNDROME: ICD-10-CM

## 2025-05-23 DIAGNOSIS — R30.0 DYSURIA: ICD-10-CM

## 2025-05-23 DIAGNOSIS — R53.83 LOW ENERGY: ICD-10-CM

## 2025-05-23 DIAGNOSIS — G89.29 CHRONIC SUPRAPUBIC PAIN: ICD-10-CM

## 2025-05-23 DIAGNOSIS — J45.51 SEVERE PERSISTENT ASTHMA WITH ACUTE EXACERBATION (H): ICD-10-CM

## 2025-05-23 DIAGNOSIS — R10.2 CHRONIC SUPRAPUBIC PAIN: ICD-10-CM

## 2025-05-23 LAB
ALBUMIN UR-MCNC: NEGATIVE MG/DL
APPEARANCE UR: CLEAR
BACTERIA #/AREA URNS HPF: ABNORMAL /HPF
BACTERIAL VAGINOSIS VAG-IMP: POSITIVE
BASOPHILS # BLD AUTO: 0 10E3/UL (ref 0–0.2)
BASOPHILS NFR BLD AUTO: 0 %
BILIRUB UR QL STRIP: NEGATIVE
CANDIDA DNA VAG QL NAA+PROBE: NOT DETECTED
CANDIDA GLABRATA / CANDIDA KRUSEI DNA: NOT DETECTED
COLOR UR AUTO: YELLOW
EOSINOPHIL # BLD AUTO: 0.1 10E3/UL (ref 0–0.7)
EOSINOPHIL NFR BLD AUTO: 1 %
ERYTHROCYTE [DISTWIDTH] IN BLOOD BY AUTOMATED COUNT: 12.9 % (ref 10–15)
GLUCOSE UR STRIP-MCNC: NEGATIVE MG/DL
HCT VFR BLD AUTO: 40.5 % (ref 35–47)
HGB BLD-MCNC: 13.4 G/DL (ref 11.7–15.7)
HGB UR QL STRIP: ABNORMAL
IMM GRANULOCYTES # BLD: 0.1 10E3/UL
IMM GRANULOCYTES NFR BLD: 1 %
KETONES UR STRIP-MCNC: NEGATIVE MG/DL
LEUKOCYTE ESTERASE UR QL STRIP: NEGATIVE
LYMPHOCYTES # BLD AUTO: 2.6 10E3/UL (ref 0.8–5.3)
LYMPHOCYTES NFR BLD AUTO: 32 %
MCH RBC QN AUTO: 27.5 PG (ref 26.5–33)
MCHC RBC AUTO-ENTMCNC: 33.1 G/DL (ref 31.5–36.5)
MCV RBC AUTO: 83 FL (ref 78–100)
MONOCYTES # BLD AUTO: 0.5 10E3/UL (ref 0–1.3)
MONOCYTES NFR BLD AUTO: 6 %
NEUTROPHILS # BLD AUTO: 5 10E3/UL (ref 1.6–8.3)
NEUTROPHILS NFR BLD AUTO: 60 %
NITRATE UR QL: NEGATIVE
PH UR STRIP: 6.5 [PH] (ref 5–8)
PLATELET # BLD AUTO: 203 10E3/UL (ref 150–450)
RBC # BLD AUTO: 4.87 10E6/UL (ref 3.8–5.2)
RBC #/AREA URNS AUTO: ABNORMAL /HPF
SP GR UR STRIP: 1.01 (ref 1–1.03)
SQUAMOUS #/AREA URNS AUTO: ABNORMAL /LPF
T VAGINALIS DNA VAG QL NAA+PROBE: NOT DETECTED
UROBILINOGEN UR STRIP-ACNC: 0.2 E.U./DL
WBC # BLD AUTO: 8.2 10E3/UL (ref 4–11)
WBC #/AREA URNS AUTO: ABNORMAL /HPF

## 2025-05-23 PROCEDURE — 3074F SYST BP LT 130 MM HG: CPT | Performed by: FAMILY MEDICINE

## 2025-05-23 PROCEDURE — 99214 OFFICE O/P EST MOD 30 MIN: CPT | Mod: 25 | Performed by: FAMILY MEDICINE

## 2025-05-23 PROCEDURE — 3078F DIAST BP <80 MM HG: CPT | Performed by: FAMILY MEDICINE

## 2025-05-23 PROCEDURE — 81515 NFCT DS BV&VAGINITIS DNA ALG: CPT | Performed by: FAMILY MEDICINE

## 2025-05-23 PROCEDURE — 83550 IRON BINDING TEST: CPT | Performed by: FAMILY MEDICINE

## 2025-05-23 PROCEDURE — 85025 COMPLETE CBC W/AUTO DIFF WBC: CPT | Performed by: FAMILY MEDICINE

## 2025-05-23 PROCEDURE — 84439 ASSAY OF FREE THYROXINE: CPT | Performed by: FAMILY MEDICINE

## 2025-05-23 PROCEDURE — 82728 ASSAY OF FERRITIN: CPT | Performed by: FAMILY MEDICINE

## 2025-05-23 PROCEDURE — 99395 PREV VISIT EST AGE 18-39: CPT | Mod: 25 | Performed by: FAMILY MEDICINE

## 2025-05-23 PROCEDURE — 84443 ASSAY THYROID STIM HORMONE: CPT | Performed by: FAMILY MEDICINE

## 2025-05-23 PROCEDURE — 83540 ASSAY OF IRON: CPT | Performed by: FAMILY MEDICINE

## 2025-05-23 PROCEDURE — 81001 URINALYSIS AUTO W/SCOPE: CPT | Performed by: FAMILY MEDICINE

## 2025-05-23 PROCEDURE — 36415 COLL VENOUS BLD VENIPUNCTURE: CPT | Performed by: FAMILY MEDICINE

## 2025-05-23 RX ORDER — TOPIRAMATE 50 MG/1
50 TABLET, FILM COATED ORAL 2 TIMES DAILY
Qty: 60 TABLET | Refills: 1 | Status: SHIPPED | OUTPATIENT
Start: 2025-05-23

## 2025-05-23 SDOH — HEALTH STABILITY: PHYSICAL HEALTH: ON AVERAGE, HOW MANY DAYS PER WEEK DO YOU ENGAGE IN MODERATE TO STRENUOUS EXERCISE (LIKE A BRISK WALK)?: 3 DAYS

## 2025-05-23 ASSESSMENT — PATIENT HEALTH QUESTIONNAIRE - PHQ9
SUM OF ALL RESPONSES TO PHQ QUESTIONS 1-9: 16
10. IF YOU CHECKED OFF ANY PROBLEMS, HOW DIFFICULT HAVE THESE PROBLEMS MADE IT FOR YOU TO DO YOUR WORK, TAKE CARE OF THINGS AT HOME, OR GET ALONG WITH OTHER PEOPLE: VERY DIFFICULT
SUM OF ALL RESPONSES TO PHQ QUESTIONS 1-9: 16

## 2025-05-23 ASSESSMENT — ASTHMA QUESTIONNAIRES
QUESTION_2 LAST FOUR WEEKS HOW OFTEN HAVE YOU HAD SHORTNESS OF BREATH: ONCE OR TWICE A WEEK
QUESTION_1 LAST FOUR WEEKS HOW MUCH OF THE TIME DID YOUR ASTHMA KEEP YOU FROM GETTING AS MUCH DONE AT WORK, SCHOOL OR AT HOME: A LITTLE OF THE TIME
QUESTION_1 LAST FOUR WEEKS HOW MUCH OF THE TIME DID YOUR ASTHMA KEEP YOU FROM GETTING AS MUCH DONE AT WORK, SCHOOL OR AT HOME: NONE OF THE TIME
QUESTION_3 LAST FOUR WEEKS HOW OFTEN DID YOUR ASTHMA SYMPTOMS (WHEEZING, COUGHING, SHORTNESS OF BREATH, CHEST TIGHTNESS OR PAIN) WAKE YOU UP AT NIGHT OR EARLIER THAN USUAL IN THE MORNING: NOT AT ALL
QUESTION_5 LAST FOUR WEEKS HOW WOULD YOU RATE YOUR ASTHMA CONTROL: WELL CONTROLLED
QUESTION_4 LAST FOUR WEEKS HOW OFTEN HAVE YOU USED YOUR RESCUE INHALER OR NEBULIZER MEDICATION (SUCH AS ALBUTEROL): NOT AT ALL
QUESTION_4 LAST FOUR WEEKS HOW OFTEN HAVE YOU USED YOUR RESCUE INHALER OR NEBULIZER MEDICATION (SUCH AS ALBUTEROL): ONCE A WEEK OR LESS
ACT_TOTALSCORE: 24
QUESTION_2 LAST FOUR WEEKS HOW OFTEN HAVE YOU HAD SHORTNESS OF BREATH: NOT AT ALL
QUESTION_5 LAST FOUR WEEKS HOW WOULD YOU RATE YOUR ASTHMA CONTROL: SOMEWHAT CONTROLLED
QUESTION_3 LAST FOUR WEEKS HOW OFTEN DID YOUR ASTHMA SYMPTOMS (WHEEZING, COUGHING, SHORTNESS OF BREATH, CHEST TIGHTNESS OR PAIN) WAKE YOU UP AT NIGHT OR EARLIER THAN USUAL IN THE MORNING: NOT AT ALL
ACT_TOTALSCORE: 24

## 2025-05-23 ASSESSMENT — SOCIAL DETERMINANTS OF HEALTH (SDOH): HOW OFTEN DO YOU GET TOGETHER WITH FRIENDS OR RELATIVES?: ONCE A WEEK

## 2025-05-23 NOTE — PATIENT INSTRUCTIONS
Patient Education   Preventive Care Advice   This is general advice given by our system to help you stay healthy. However, your care team may have specific advice just for you. Please talk to your care team about your preventive care needs.  Nutrition  Eat 5 or more servings of fruits and vegetables each day.  Try wheat bread, brown rice and whole grain pasta (instead of white bread, rice, and pasta).  Get enough calcium and vitamin D. Check the label on foods and aim for 100% of the RDA (recommended daily allowance).  Lifestyle  Exercise at least 150 minutes each week  (30 minutes a day, 5 days a week).  Do muscle strengthening activities 2 days a week. These help control your weight and prevent disease.  No smoking.  Wear sunscreen to prevent skin cancer.  Have a dental exam and cleaning every 6 months.  Yearly exams  See your health care team every year to talk about:  Any changes in your health.  Any medicines your care team has prescribed.  Preventive care, family planning, and ways to prevent chronic diseases.  Shots (vaccines)   HPV shots (up to age 26), if you've never had them before.  Hepatitis B shots (up to age 59), if you've never had them before.  COVID-19 shot: Get this shot when it's due.  Flu shot: Get a flu shot every year.  Tetanus shot: Get a tetanus shot every 10 years.  Pneumococcal, hepatitis A, and RSV shots: Ask your care team if you need these based on your risk.  Shingles shot (for age 50 and up)  General health tests  Diabetes screening:  Starting at age 35, Get screened for diabetes at least every 3 years.  If you are younger than age 35, ask your care team if you should be screened for diabetes.  Cholesterol test: At age 39, start having a cholesterol test every 5 years, or more often if advised.  Bone density scan (DEXA): At age 50, ask your care team if you should have this scan for osteoporosis (brittle bones).  Hepatitis C: Get tested at least once in your life.  STIs (sexually  transmitted infections)  Before age 24: Ask your care team if you should be screened for STIs.  After age 24: Get screened for STIs if you're at risk. You are at risk for STIs (including HIV) if:  You are sexually active with more than one person.  You don't use condoms every time.  You or a partner was diagnosed with a sexually transmitted infection.  If you are at risk for HIV, ask about PrEP medicine to prevent HIV.  Get tested for HIV at least once in your life, whether you are at risk for HIV or not.  Cancer screening tests  Cervical cancer screening: If you have a cervix, begin getting regular cervical cancer screening tests starting at age 21.  Breast cancer scan (mammogram): If you've ever had breasts, begin having regular mammograms starting at age 40. This is a scan to check for breast cancer.  Colon cancer screening: It is important to start screening for colon cancer at age 45.  Have a colonoscopy test every 10 years (or more often if you're at risk) Or, ask your provider about stool tests like a FIT test every year or Cologuard test every 3 years.  To learn more about your testing options, visit:   .  For help making a decision, visit:   https://bit.ly/pc01419.  Prostate cancer screening test: If you have a prostate, ask your care team if a prostate cancer screening test (PSA) at age 55 is right for you.  Lung cancer screening: If you are a current or former smoker ages 50 to 80, ask your care team if ongoing lung cancer screenings are right for you.  For informational purposes only. Not to replace the advice of your health care provider. Copyright   2023 [facility]. All rights reserved. Clinically reviewed by the [facility]. [employer] 319025 - REV 01/24.  Learning About Stress  What is stress?     Stress is your body's response to a hard situation. Your body can have a physical, emotional, or mental response. Stress is a fact of life for most people, and it  affects everyone differently. What causes stress for you may not be stressful for someone else.  A lot of things can cause stress. You may feel stress when you go on a job interview, take a test, or run a race. This kind of short-term stress is normal and even useful. It can help you if you need to work hard or react quickly. For example, stress can help you finish an important job on time.  Long-term stress is caused by ongoing stressful situations or events. Examples of long-term stress include long-term health problems, ongoing problems at work, or conflicts in your family. Long-term stress can harm your health.  How does stress affect your health?  When you are stressed, your body responds as though you are in danger. It makes hormones that speed up your heart, make you breathe faster, and give you a burst of energy. This is called the fight-or-flight stress response. If the stress is over quickly, your body goes back to normal and no harm is done.  But if stress happens too often or lasts too long, it can have bad effects. Long-term stress can make you more likely to get sick, and it can make symptoms of some diseases worse. If you tense up when you are stressed, you may develop neck, shoulder, or low back pain. Stress is linked to high blood pressure and heart disease.  Stress also harms your emotional health. It can make you reyes, tense, or depressed. Your relationships may suffer, and you may not do well at work or school.  What can you do to manage stress?  You can try these things to help manage stress:   Do something active. Exercise or activity can help reduce stress. Walking is a great way to get started. Even everyday activities such as housecleaning or yard work can help.  Try yoga or patricia chi. These techniques combine exercise and meditation. You may need some training at first to learn them.  Do something you enjoy. For example, listen to music or go to a movie. Practice your hobby or do volunteer  "work.  Meditate. This can help you relax, because you are not worrying about what happened before or what may happen in the future.  Do guided imagery. Imagine yourself in any setting that helps you feel calm. You can use online videos, books, or a teacher to guide you.  Do breathing exercises. For example:  From a standing position, bend forward from the waist with your knees slightly bent. Let your arms dangle close to the floor.  Breathe in slowly and deeply as you return to a standing position. Roll up slowly and lift your head last.  Hold your breath for just a few seconds in the standing position.  Breathe out slowly and bend forward from the waist.  Let your feelings out. Talk, laugh, cry, and express anger when you need to. Talking with supportive friends or family, a counselor, or a hellen leader about your feelings is a healthy way to relieve stress. Avoid discussing your feelings with people who make you feel worse.  Write. It may help to write about things that are bothering you. This helps you find out how much stress you feel and what is causing it. When you know this, you can find better ways to cope.  What can you do to prevent stress?  You might try some of these things to help prevent stress:  Manage your time. This helps you find time to do the things you want and need to do.  Get enough sleep. Your body recovers from the stresses of the day while you are sleeping.  Get support. Your family, friends, and community can make a difference in how you experience stress.  Limit your news feed. Avoid or limit time on social media or news that may make you feel stressed.  Do something active. Exercise or activity can help reduce stress. Walking is a great way to get started.  Where can you learn more?  Go to https://www.[employer].net/patiented  Enter N032 in the search box to learn more about \"Learning About Stress.\"  Current as of: October 24, 2024  Content Version: 14.4 2024-2025 [employer].   Care instructions adapted under license by your healthcare professional. If you have questions about a medical condition or this instruction, always ask your healthcare professional. [employer] disclaims any warranty or liability for your use of this information.    Learning About Depression Screening  What is depression screening?  Depression screening is a way to see if you have depression symptoms. It may be done by a doctor or counselor. It's often part of a routine checkup. That's because your mental health is just as important as your physical health.  Depression is a mental health condition that affects how you feel, think, and act. You may:  Have less energy.  Lose interest in your daily activities.  Feel sad and grouchy for a long time.  Depression is very common. It affects people of all ages.  Many things can lead to depression. Some people become depressed after they have a stroke or find out they have a major illness like cancer or heart disease. The death of a loved one or a breakup may lead to depression. It can run in families. Most experts believe that a combination of inherited genes and stressful life events can cause it.  What happens during screening?  You may be asked to fill out a form about your depression symptoms. You and the doctor will discuss your answers. The doctor may ask you more questions to learn more about how you think, act, and feel.  What happens after screening?  If you have symptoms of depression, your doctor will talk to you about your options.  Doctors usually treat depression with medicines or counseling. Often, combining the two works best. Many people don't get help because they think that they'll get over the depression on their own. But people with depression may not get better unless they get treatment.  The cause of depression is not well understood. There may be many factors involved. But if you have depression, it's not your fault.  A serious  "symptom of depression is thinking about death or suicide. If you or someone you care about talks about this or about feeling hopeless, get help right away.  It's important to know that depression can be treated. Medicine, counseling, and self-care may help.  Where can you learn more?  Go to https://www.[employer].net/patiented  Enter T185 in the search box to learn more about \"Learning About Depression Screening.\"  Current as of: July 31, 2024  Content Version: 14.4    4799-1352 [employer].   Care instructions adapted under license by your healthcare professional. If you have questions about a medical condition or this instruction, always ask your healthcare professional. [employer] disclaims any warranty or liability for your use of this information.       "

## 2025-05-23 NOTE — PROGRESS NOTES
"Preventive Care Visit  [facility]  EASTON HENRIQUEZ DO, Family Medicine  May 23, 2025  {Provider  Link to SmartSet :047789}    {PROVIDER CHARTING PREFERENCE:035022}    Shilpa Ramires is a 39 year old, presenting for the following:  Physical (Possible UTI)        5/23/2025     3:16 PM   Additional Questions   Accompanied by Son          HPI     Patient has poor sleep and is unable to do in lab sleep study.     Patient has a new odor (rotten egg odor). Patient's odor for several months. She's noticed no change in vaginal discharge. Patient denies any pain or discomfort vaginally.     Patient reports episode of cluster headache and had blurry vision. Patient still having eye blurriness. She did visit with optometry. She reports that using marijuana provides relief. However, she does not want to be \"smoking weed all the time\"     Patient had a wasp sting.       Advance Care Planning  Discussed advance care planning with patient; informed AVS has link to [facility] [employer].        5/23/2025   General Health   How would you rate your overall physical health? (!) FAIR   Feel stress (tense, anxious, or unable to sleep) Rather much   (!) STRESS CONCERN      5/23/2025   Nutrition   Three or more servings of calcium each day? (!) NO   Diet: I don't know   How many servings of fruit and vegetables per day? (!) 2-3   How many sweetened beverages each day? (!) 3         5/23/2025   Exercise   Days per week of moderate/strenous exercise 3 days         5/23/2025   Social Factors   Frequency of gathering with friends or relatives Once a week   Worry food won't last until get money to buy more No   Food not last or not have enough money for food? No   Do you have housing? (Housing is defined as stable permanent housing and does not include staying outside in a car, in a tent, in an abandoned building, in an overnight shelter, or couch-surfing.) Yes   Are you worried about losing your housing? No " "  Lack of transportation? Yes   Unable to get utilities (heat,electricity)? No    (!) TRANSPORTATION CONCERN PRESENT      5/23/2025   Dental   Dentist two times every year? (!) NO       Today's PHQ-9 Score:       5/23/2025     3:12 PM   PHQ-9 SCORE   PHQ-9 Total Score MyChart 16 (Moderately severe depression)   PHQ-9 Total Score 16        Patient-reported         5/23/2025   Substance Use   Alcohol more than 3/day or more than 7/wk Not Applicable   Do you use any other substances recreationally? (!) DECLINE     Social History     Tobacco Use    Smoking status: Every Day     Current packs/day: 0.50     Average packs/day: 0.5 packs/day for 20.0 years (10.0 ttl pk-yrs)     Types: Cigarettes     Passive exposure: Never    Smokeless tobacco: Former   Vaping Use    Vaping status: Some Days    Substances: Nicotine, Flavoring    Devices: Disposable   Substance Use Topics    Alcohol use: No    Drug use: No          {Mammogram Decision Support (Optional):749697}        5/23/2025   STI Screening   New sexual partner(s) since last STI/HIV test? No     History of abnormal Pap smear: { :328567}        Latest Ref Rng & Units 4/8/2024     3:46 PM   PAP / HPV   PAP  Negative for Intraepithelial Lesion or Malignancy (NILM)    HPV 16 DNA Negative Negative    HPV 18 DNA Negative Negative    Other HR HPV Negative Negative            5/23/2025   Contraception/Family Planning   Questions about contraception or family planning No     {Provider  REQUIRED FOR AWV Use the storyboard to review patient history, after sections have been marked as reviewed, refresh note to capture documentation:592700}   Reviewed and updated as needed this visit by Provider                    {HISTORY OPTIONS (Optional):497169}    {ROS Picklists (Optional):644802}     Objective    Exam  /75   Pulse 66   Temp 98.2  F (36.8  C) (Oral)   Resp 20   Ht 1.702 m (5' 7\")   Wt 109 kg (240 lb 3.2 oz)   LMP  (LMP Unknown)   SpO2 98%   BMI 37.62 kg/m   " "  Estimated body mass index is 37.62 kg/m  as calculated from the following:    Height as of this encounter: 1.702 m (5' 7\").    Weight as of this encounter: 109 kg (240 lb 3.2 oz).    Physical Exam  {Exam Choices (Optional):539482}        Signed Electronically by: EASTON HENRIQUEZ DO  {Email feedback regarding this note to [email or web address]   :756355}  Answers submitted by the patient for this visit:  Patient Health Questionnaire (Submitted on 5/23/2025)  If you checked off any problems, how difficult have these problems made it for you to do your work, take care of things at home, or get along with other people?: Very difficult  PHQ9 TOTAL SCORE: 16    " "Planning   Questions about contraception or family planning No        Reviewed and updated as needed this visit by Provider                    History reviewed. No pertinent past medical history.  History reviewed. No pertinent surgical history.      Review of Systems  Constitutional, neuro, ENT, endocrine, pulmonary, cardiac, gastrointestinal, genitourinary, musculoskeletal, integument and psychiatric systems are negative, except as otherwise noted.     Objective    Exam  /75   Pulse 66   Temp 98.2  F (36.8  C) (Oral)   Resp 20   Ht 1.702 m (5' 7\")   Wt 109 kg (240 lb 3.2 oz)   LMP  (LMP Unknown)   SpO2 98%   BMI 37.62 kg/m     Estimated body mass index is 37.62 kg/m  as calculated from the following:    Height as of this encounter: 1.702 m (5' 7\").    Weight as of this encounter: 109 kg (240 lb 3.2 oz).    Physical Exam  GENERAL: alert and no distress  EYES: Eyes grossly normal to inspection, PERRL and conjunctivae and sclerae normal  MS: no gross musculoskeletal defects noted, no edema  SKIN: no suspicious lesions or rashes  NEURO: Normal strength and tone, mentation intact and speech normal  PSYCH: mentation appears normal, affect normal/bright        Signed Electronically by: EASTON HENRIQUEZ DO    Answers submitted by the patient for this visit:  Patient Health Questionnaire (Submitted on 5/23/2025)  If you checked off any problems, how difficult have these problems made it for you to do your work, take care of things at home, or get along with other people?: Very difficult  PHQ9 TOTAL SCORE: 16    "

## 2025-05-24 LAB
FERRITIN SERPL-MCNC: 21 NG/ML (ref 6–175)
IRON BINDING CAPACITY (ROCHE): 331 UG/DL (ref 240–430)
IRON SATN MFR SERPL: 14 % (ref 15–46)
IRON SERPL-MCNC: 47 UG/DL (ref 37–145)
T4 FREE SERPL-MCNC: 0.85 NG/DL (ref 0.9–1.7)
TSH SERPL DL<=0.005 MIU/L-ACNC: 4.89 UIU/ML (ref 0.3–4.2)

## 2025-05-27 ENCOUNTER — RESULTS FOLLOW-UP (OUTPATIENT)
Dept: INTERNAL MEDICINE | Facility: CLINIC | Age: 40
End: 2025-05-27

## 2025-05-27 NOTE — RESULT ENCOUNTER NOTE
Please let Keyla know:  Recurrent BV- treat with clindamycin cream rather than metronidazole. If that fails, the next treatment involves oral medication. She and I can discuss at that point, as I know she is intolerant of the oral medication.  Fatigue- it COULD be related to a low thyroid. We will try a low dose 50 mcg to see if that helps symptoms and follow up with blood work. It is important she take it on an empty stomach.    EASTON HENRIQUEZ, DO

## 2025-05-29 ENCOUNTER — TELEPHONE (OUTPATIENT)
Dept: FAMILY MEDICINE | Facility: CLINIC | Age: 40
End: 2025-05-29
Payer: COMMERCIAL

## 2025-05-29 DIAGNOSIS — N76.0 BACTERIAL VAGINOSIS: Primary | ICD-10-CM

## 2025-05-29 DIAGNOSIS — B96.89 BACTERIAL VAGINOSIS: Primary | ICD-10-CM

## 2025-05-29 NOTE — TELEPHONE ENCOUNTER
New Medication Request    Contacts       Contact Date/Time Type Contact Phone/Fax    05/29/2025 10:06 AM CDT Phone (Incoming) Keyla Arnold (Self) [phone] (M)            What medication are you requesting?: Keyla    Reason for medication request: Antibacterial for BV and another one for hemoglobin not sure name     Have you taken this medication before?: No    Controlled Substance Agreement on file:   CSA -- Patient Level:    CSA: None found at the patient level.         Patient offered an appointment? No    Preferred Pharmacy:       [id number] - [place], [place] - [address] [place] [address] [address]  [address] [place] [address]  Phone: [phone] Fax: [phone]      Could we send this information to you in [employer] or would you prefer to receive a phone call?:   Patient would prefer a phone call   Okay to leave a detailed message?: Yes at Cell number on file:    Telephone Information:   Mobile [phone]

## 2025-05-29 NOTE — TELEPHONE ENCOUNTER
"Patient called requesting advice from provider. Patient states she soaks in a hot bath for 10-20 minutes to relieve her headaches. After completing a home pH test, patient states her bathtub water is \"high\". Writer attempted to clarify with patient if \"high\" meant high pH water that is basic, or if \"high\" meant high levels of acidity. Patient was unable to find the packaging and test strip and could not clarify. Patient asking provider if \"high\" levels on pH test could contribute to her bacterial vaginosis. If so, should she take fewer baths? Routing to provider.   "

## 2025-06-01 NOTE — TELEPHONE ENCOUNTER
Patient returning call regarding lab results.   Patient would like to go ahead with treating the BV and low thyroid. Patient requesting [facility] on [address] [facility] in [place].     Routing high priority as patient had already called on Friday.   Halle Childress RN   06/01/25 2:51 PM  [facility] Nurse Advisor

## 2025-06-02 NOTE — TELEPHONE ENCOUNTER
"Patient called and reported she is \"willing to try anything\" at this point. Routing to PCP to advise on appropriate oral BV treatment.   "

## 2025-06-02 NOTE — TELEPHONE ENCOUNTER
No, bacterial vaginosis can occur due to pH imbalances, but more likely it is the direct moisture that is contributing. However, in this situation, Keyla has been unable to do any of the oral treatments. As stated in prior visits, oral treatments are much more effective for resistant BV.

## 2025-06-03 RX ORDER — METRONIDAZOLE 500 MG/1
500 TABLET ORAL 2 TIMES DAILY
Qty: 14 TABLET | Refills: 0 | Status: SHIPPED | OUTPATIENT
Start: 2025-06-03 | End: 2025-06-10

## 2025-06-04 NOTE — TELEPHONE ENCOUNTER
Spoke with patient and relayed information below from Dr. Dempsey. Patient verbalized understanding and has no further questions.

## 2025-06-24 ENCOUNTER — OFFICE VISIT (OUTPATIENT)
Dept: FAMILY MEDICINE | Facility: CLINIC | Age: 40
End: 2025-06-24
Payer: COMMERCIAL

## 2025-06-24 VITALS
WEIGHT: 237.5 LBS | RESPIRATION RATE: 16 BRPM | TEMPERATURE: 98.3 F | DIASTOLIC BLOOD PRESSURE: 60 MMHG | SYSTOLIC BLOOD PRESSURE: 102 MMHG | BODY MASS INDEX: 37.28 KG/M2 | HEART RATE: 68 BPM | OXYGEN SATURATION: 98 % | HEIGHT: 67 IN

## 2025-06-24 DIAGNOSIS — B96.89 BACTERIAL VAGINOSIS: ICD-10-CM

## 2025-06-24 DIAGNOSIS — H91.93 HEARING DECREASED, BILATERAL: ICD-10-CM

## 2025-06-24 DIAGNOSIS — E03.9 HYPOTHYROIDISM, UNSPECIFIED TYPE: ICD-10-CM

## 2025-06-24 DIAGNOSIS — J45.20 MILD INTERMITTENT ASTHMA WITHOUT COMPLICATION: ICD-10-CM

## 2025-06-24 DIAGNOSIS — N76.0 BACTERIAL VAGINOSIS: ICD-10-CM

## 2025-06-24 DIAGNOSIS — G44.009 MIGRAINE-CLUSTER HEADACHE SYNDROME: Primary | ICD-10-CM

## 2025-06-24 PROCEDURE — 3078F DIAST BP <80 MM HG: CPT | Performed by: FAMILY MEDICINE

## 2025-06-24 PROCEDURE — 3074F SYST BP LT 130 MM HG: CPT | Performed by: FAMILY MEDICINE

## 2025-06-24 PROCEDURE — 99214 OFFICE O/P EST MOD 30 MIN: CPT | Performed by: FAMILY MEDICINE

## 2025-06-24 PROCEDURE — G2211 COMPLEX E/M VISIT ADD ON: HCPCS | Performed by: FAMILY MEDICINE

## 2025-06-24 RX ORDER — LEVOTHYROXINE SODIUM 100 UG/1
100 TABLET ORAL
Qty: 60 TABLET | Refills: 0 | Status: SHIPPED | OUTPATIENT
Start: 2025-06-24

## 2025-06-24 RX ORDER — CLINDAMYCIN HYDROCHLORIDE 300 MG/1
CAPSULE ORAL
COMMUNITY
Start: 2024-10-21 | End: 2025-06-24

## 2025-06-24 RX ORDER — OXYCODONE HYDROCHLORIDE 5 MG/1
TABLET ORAL
COMMUNITY
Start: 2024-10-21 | End: 2025-06-24

## 2025-06-24 RX ORDER — IBUPROFEN 600 MG/1
600 TABLET, FILM COATED ORAL
COMMUNITY
Start: 2025-01-12

## 2025-06-24 RX ORDER — ALBUTEROL SULFATE 90 UG/1
1-2 INHALANT RESPIRATORY (INHALATION) EVERY 4 HOURS PRN
Qty: 17 G | Refills: 1 | Status: SHIPPED | OUTPATIENT
Start: 2025-06-24

## 2025-06-24 RX ORDER — TOPIRAMATE 50 MG/1
50 TABLET, FILM COATED ORAL 2 TIMES DAILY
Qty: 180 TABLET | Refills: 1 | Status: SHIPPED | OUTPATIENT
Start: 2025-06-24

## 2025-06-24 RX ORDER — CETIRIZINE HCL 5 MG
TABLET,CHEWABLE ORAL
COMMUNITY
Start: 2024-10-18 | End: 2025-06-24

## 2025-06-24 RX ORDER — CEPHALEXIN 500 MG/1
CAPSULE ORAL
COMMUNITY
Start: 2024-08-27 | End: 2025-06-24

## 2025-06-24 RX ORDER — ACETAMINOPHEN 500 MG
500-1000 TABLET ORAL
COMMUNITY
Start: 2025-01-12

## 2025-06-24 RX ORDER — ACETAMINOPHEN 160 MG/5ML
SUSPENSION ORAL
COMMUNITY
Start: 2024-10-18 | End: 2025-06-24

## 2025-06-24 ASSESSMENT — ENCOUNTER SYMPTOMS: HEADACHES: 1
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
## 2025-06-24 NOTE — PATIENT INSTRUCTIONS
Good job with the migraine medication! Keep taking the topamax. It can cause memory issues so let us know if it's causing any issues.   Let's get your hearing checked since you are having those symptoms.   Start the thyroid medication. Eat it on an empty stomach.

## 2025-06-25 ENCOUNTER — PATIENT OUTREACH (OUTPATIENT)
Dept: CARE COORDINATION | Facility: CLINIC | Age: 40
End: 2025-06-25
Payer: COMMERCIAL